# Patient Record
Sex: MALE | Race: WHITE | URBAN - METROPOLITAN AREA
[De-identification: names, ages, dates, MRNs, and addresses within clinical notes are randomized per-mention and may not be internally consistent; named-entity substitution may affect disease eponyms.]

---

## 2017-01-02 ENCOUNTER — HOSPITAL ENCOUNTER (EMERGENCY)
Facility: CLINIC | Age: 62
Discharge: HOME | End: 2017-01-03
Attending: EMERGENCY MEDICINE | Admitting: EMERGENCY MEDICINE
Payer: COMMERCIAL

## 2017-01-02 DIAGNOSIS — K80.50 BILIARY COLIC: ICD-10-CM

## 2017-01-02 PROCEDURE — 99285 EMERGENCY DEPT VISIT HI MDM: CPT | Performed by: EMERGENCY MEDICINE

## 2017-01-02 PROCEDURE — 93005 ELECTROCARDIOGRAM TRACING: CPT | Performed by: EMERGENCY MEDICINE

## 2017-01-02 PROCEDURE — 99285 EMERGENCY DEPT VISIT HI MDM: CPT | Mod: 25 | Performed by: EMERGENCY MEDICINE

## 2017-01-02 PROCEDURE — 93010 ELECTROCARDIOGRAM REPORT: CPT | Mod: Z6 | Performed by: EMERGENCY MEDICINE

## 2017-01-02 NOTE — ED AVS SNAPSHOT
[facility], [place], Emergency Department    [address]    [facility] [address]    Phone:  [phone]    Fax:  [phone]                                       Jaya Mari   MRN: [id number]    Department:  [facility], Emergency Department   Date of Visit:  1/2/2017           After Visit Summary Signature Page     I have received my discharge instructions, and my questions have been answered. I have discussed any challenges I see with this plan with the nurse or doctor.    ..........................................................................................................................................  Patient/Patient Representative Signature      ..........................................................................................................................................  Patient Representative Print Name and Relationship to Patient    ..................................................               ................................................  Date                                            Time    ..........................................................................................................................................  Reviewed by Signature/Title    ...................................................              ..............................................  Date                                                            Time

## 2017-01-02 NOTE — ED AVS SNAPSHOT
[facility], Emergency Department    [address]    [place] [address]    Phone:  [phone]    Fax:  [phone]                                       Jaya Mari   MRN: [id number]    Department:  [facility], Emergency Department   Date of Visit:  1/2/2017           Patient Information     Date Of Birth          1955        Your diagnoses for this visit were:     Biliary colic        You were seen by Jericho Walker MD.        Discharge Instructions       Please make an appointment to follow up with Surgery (General) (phone: [phone]) as soon as possible.  Return if worsening pain or fever.            24 Hour Appointment Hotline       To make an appointment at any [facility] clinic, call [phone] ([phone]). If you don't have a family doctor or clinic, we will help you find one. [facility] clinics are conveniently located to serve the needs of you and your family.             Review of your medicines      START taking        Dose / Directions Last dose taken    oxyCODONE 5 MG IR tablet   Commonly known as:  ROXICODONE   Dose:  5 mg   Quantity:  20 tablet        Take 1 tablet (5 mg) by mouth every 6 hours as needed for pain   Refills:  0          Our records show that you are taking the medicines listed below. If these are incorrect, please call your family doctor or clinic.        Dose / Directions Last dose taken    aspirin 325 MG tablet        Take by mouth daily   Refills:  0        atorvastatin 20 MG tablet   Commonly known as:  LIPITOR   Dose:  20 mg   Quantity:  90 tablet        Take 1 tablet (20 mg) by mouth daily   Refills:  1        metoprolol 25 MG tablet   Commonly known as:  LOPRESSOR   Quantity:  180 tablet        3 tabs twice daily (Cancel previous RX)   Refills:  5                Prescriptions were sent or printed at these locations (1 Prescription)                   Other Prescriptions                Printed at Department/Unit printer (1 of 1)          oxyCODONE (ROXICODONE) 5 MG IR tablet                Procedures and tests performed during your visit     CBC with platelets differential    Comprehensive metabolic panel    EKG 12 lead    Lipase    Troponin POCT      Orders Needing Specimen Collection     None      Pending Results     No orders found for last 2 day(s).            Thank you for choosing [facility]       Thank you for choosing [facility] for your care. Our goal is always to provide you with excellent care. Hearing back from our patients is one way we can continue to improve our services. Please take a few minutes to complete the written survey that you may receive in the mail after you visit with us. Thank you!        [employer] Information     [employer] gives you secure access to your electronic health record. If you see a primary care provider, you can also send messages to your care team and make appointments. If you have questions, please call your primary care clinic.  If you do not have a primary care provider, please call [phone] and they will assist you.        After Visit Summary       This is your record. Keep this with you and show to your community pharmacist(s) and doctor(s) at your next visit.

## 2017-01-03 VITALS
RESPIRATION RATE: 18 BRPM | SYSTOLIC BLOOD PRESSURE: 140 MMHG | OXYGEN SATURATION: 94 % | WEIGHT: 210 LBS | BODY MASS INDEX: 28.44 KG/M2 | DIASTOLIC BLOOD PRESSURE: 91 MMHG | HEIGHT: 72 IN | TEMPERATURE: 97.9 F

## 2017-01-03 LAB
ALBUMIN SERPL-MCNC: 4.1 G/DL (ref 3.4–5)
ALP SERPL-CCNC: 83 U/L (ref 40–150)
ALT SERPL W P-5'-P-CCNC: 16 U/L (ref 0–70)
ANION GAP SERPL CALCULATED.3IONS-SCNC: 8 MMOL/L (ref 3–14)
AST SERPL W P-5'-P-CCNC: 10 U/L (ref 0–45)
BASOPHILS # BLD AUTO: 0 10E9/L (ref 0–0.2)
BASOPHILS NFR BLD AUTO: 0.2 %
BILIRUB SERPL-MCNC: 0.4 MG/DL (ref 0.2–1.3)
BUN SERPL-MCNC: 15 MG/DL (ref 7–30)
CALCIUM SERPL-MCNC: 8.7 MG/DL (ref 8.5–10.1)
CHLORIDE SERPL-SCNC: 106 MMOL/L (ref 94–109)
CO2 SERPL-SCNC: 25 MMOL/L (ref 20–32)
CREAT SERPL-MCNC: 1.12 MG/DL (ref 0.66–1.25)
DIFFERENTIAL METHOD BLD: NORMAL
EOSINOPHIL # BLD AUTO: 0.1 10E9/L (ref 0–0.7)
EOSINOPHIL NFR BLD AUTO: 0.7 %
ERYTHROCYTE [DISTWIDTH] IN BLOOD BY AUTOMATED COUNT: 13.6 % (ref 10–15)
GFR SERPL CREATININE-BSD FRML MDRD: 67 ML/MIN/1.7M2
GLUCOSE SERPL-MCNC: 141 MG/DL (ref 70–99)
HCT VFR BLD AUTO: 47.8 % (ref 40–53)
HGB BLD-MCNC: 16.2 G/DL (ref 13.3–17.7)
IMM GRANULOCYTES # BLD: 0 10E9/L (ref 0–0.4)
IMM GRANULOCYTES NFR BLD: 0.3 %
INTERPRETATION ECG - MUSE: NORMAL
LIPASE SERPL-CCNC: 201 U/L (ref 73–393)
LYMPHOCYTES # BLD AUTO: 1.1 10E9/L (ref 0.8–5.3)
LYMPHOCYTES NFR BLD AUTO: 11.6 %
MCH RBC QN AUTO: 30.5 PG (ref 26.5–33)
MCHC RBC AUTO-ENTMCNC: 33.9 G/DL (ref 31.5–36.5)
MCV RBC AUTO: 90 FL (ref 78–100)
MONOCYTES # BLD AUTO: 0.8 10E9/L (ref 0–1.3)
MONOCYTES NFR BLD AUTO: 8.4 %
NEUTROPHILS # BLD AUTO: 7.4 10E9/L (ref 1.6–8.3)
NEUTROPHILS NFR BLD AUTO: 78.8 %
NRBC # BLD AUTO: 0 10*3/UL
NRBC BLD AUTO-RTO: 0 /100
PLATELET # BLD AUTO: 194 10E9/L (ref 150–450)
POTASSIUM SERPL-SCNC: 3.4 MMOL/L (ref 3.4–5.3)
PROT SERPL-MCNC: 7.7 G/DL (ref 6.8–8.8)
RBC # BLD AUTO: 5.32 10E12/L (ref 4.4–5.9)
SODIUM SERPL-SCNC: 139 MMOL/L (ref 133–144)
TROPONIN I BLD-MCNC: 0 UG/L (ref 0–0.1)
WBC # BLD AUTO: 9.3 10E9/L (ref 4–11)

## 2017-01-03 PROCEDURE — 84484 ASSAY OF TROPONIN QUANT: CPT

## 2017-01-03 PROCEDURE — 85025 COMPLETE CBC W/AUTO DIFF WBC: CPT | Performed by: EMERGENCY MEDICINE

## 2017-01-03 PROCEDURE — 83690 ASSAY OF LIPASE: CPT | Performed by: EMERGENCY MEDICINE

## 2017-01-03 PROCEDURE — 96374 THER/PROPH/DIAG INJ IV PUSH: CPT | Performed by: EMERGENCY MEDICINE

## 2017-01-03 PROCEDURE — 25000125 ZZHC RX 250: Performed by: EMERGENCY MEDICINE

## 2017-01-03 PROCEDURE — 80053 COMPREHEN METABOLIC PANEL: CPT | Performed by: EMERGENCY MEDICINE

## 2017-01-03 PROCEDURE — 96376 TX/PRO/DX INJ SAME DRUG ADON: CPT | Performed by: EMERGENCY MEDICINE

## 2017-01-03 PROCEDURE — 36415 COLL VENOUS BLD VENIPUNCTURE: CPT | Performed by: EMERGENCY MEDICINE

## 2017-01-03 RX ORDER — HYDROMORPHONE HYDROCHLORIDE 1 MG/ML
0.5 INJECTION, SOLUTION INTRAMUSCULAR; INTRAVENOUS; SUBCUTANEOUS ONCE
Status: DISCONTINUED | OUTPATIENT
Start: 2017-01-03 | End: 2017-01-03

## 2017-01-03 RX ORDER — HYDROMORPHONE HYDROCHLORIDE 1 MG/ML
0.5 INJECTION, SOLUTION INTRAMUSCULAR; INTRAVENOUS; SUBCUTANEOUS ONCE
Status: COMPLETED | OUTPATIENT
Start: 2017-01-03 | End: 2017-01-03

## 2017-01-03 RX ORDER — OXYCODONE HYDROCHLORIDE 5 MG/1
5 TABLET ORAL EVERY 6 HOURS PRN
Qty: 20 TABLET | Refills: 0 | Status: ON HOLD | OUTPATIENT
Start: 2017-01-03 | End: 2017-01-20

## 2017-01-03 RX ADMIN — HYDROMORPHONE HYDROCHLORIDE 0.5 MG: 10 INJECTION, SOLUTION INTRAMUSCULAR; INTRAVENOUS; SUBCUTANEOUS at 01:02

## 2017-01-03 RX ADMIN — HYDROMORPHONE HYDROCHLORIDE 1 MG: 1 INJECTION, SOLUTION INTRAMUSCULAR; INTRAVENOUS; SUBCUTANEOUS at 01:51

## 2017-01-03 NOTE — ED PROVIDER NOTES
"  History     Chief Complaint   Patient presents with     Back Pain     Pt c/o upper abd/back pain. Previous h/o gallbladder attacks.     HPI  Jaya Mari is a 61 year old male who presents to the emergency department today with complaints of upper abdominal and back pain. Patient reports a history of recurrent \"gallbladder attacks\" with right sided abdominal as well as diffuse mid-back pain over the past few years. He has been evaluated in the past and found to have cholelithiasis with decreased gallbladder ejection fraction. He has met with a surgeon who recommended cholecystectomy, however, patient states the procedure was not covered by his insurance so he did not follow up with this. Patient states he has had 3 similar episodes of pain over the past year. He states this current episode started around 6 PM tonight. He reports pain across his mid-back, worse on the right, that wraps around to his right upper quadrant. He rates the pain at about an 8/10 in severity. He also reports abdominal distention though denies nausea, vomiting, diarrhea or constipation. He states his bowel movements have been normal and denies any recent blood in his stool. No denies any urinary symptoms. He states he has been taking Advil and Aleve for the pain without any improvement. He denies any previous abdominal surgeries. He denies a known history of pancreatitis. He denies any alcohol use.     I have reviewed the Medications, Allergies, Past Medical and Surgical History, and Social History in the Epic system.    No past medical history on file.    Past Surgical History   Procedure Laterality Date     Head & neck surgery       [place] teeth removed.     Esophagoscopy, gastroscopy, duodenoscopy (egd), combined N/A 6/22/2015     Procedure: COMBINED ESOPHAGOSCOPY, GASTROSCOPY, DUODENOSCOPY (EGD), BIOPSY SINGLE OR MULTIPLE;  Surgeon: Dipika Rangel MD;  Location: [facility] OR       Family History   Problem Relation Age of Onset     " DONNA Father      Heart attack at 72      Cardiovascular Father        Social History   Substance Use Topics     Smoking status: Current Every Day Smoker -- 1.00 packs/day     Smokeless tobacco: Never Used      Comment: 1 pack a day, 20+ years     Alcohol Use: No     No current facility-administered medications for this encounter.     Current Outpatient Prescriptions   Medication     metoprolol (LOPRESSOR) 25 MG tablet     atorvastatin (LIPITOR) 20 MG tablet     aspirin 325 MG tablet        Allergies   Allergen Reactions     Keflex [Cephalexin]        Review of Systems   10 pt ROS completed and negative except as noted above in HPI    Physical Exam   BP: (!) 172/96 mmHg  Heart Rate: 125  Temp: 95.9  F (35.5  C)  Resp: 18  Height: 182.9 cm (6')  Weight: 95.255 kg (210 lb)  SpO2: 95 %  Physical Exam   Constitutional: He is oriented to person, place, and time. No distress.   HENT:   Head: Normocephalic and atraumatic.   Right Ear: External ear normal.   Left Ear: External ear normal.   Mouth/Throat: Oropharynx is clear and moist. No oropharyngeal exudate.   Eyes: Conjunctivae are normal. Pupils are equal, round, and reactive to light.   Neck: Normal range of motion. Neck supple.   Cardiovascular: Normal rate and intact distal pulses.    Pulmonary/Chest: Effort normal. No respiratory distress. He has no wheezes. He has no rales. He exhibits no tenderness.   Abdominal: Soft. He exhibits no distension. There is no tenderness. There is no rebound and no guarding.   Musculoskeletal: Normal range of motion. He exhibits no edema or tenderness.   Neurological: He is alert and oriented to person, place, and time. He exhibits normal muscle tone.   Skin: Skin is warm and dry. No rash noted. He is not diaphoretic.   Nursing note and vitals reviewed.      ED Course   Procedures   12:28 AM  The patient was seen and examined by Dr. Walker in Room ED04.              EKG Interpretation:      Interpreted by Jericho Walker  Time  reviewed: 000   Symptoms at time of EKG: abdominal pain   Rhythm: ST  Rate: 101  Axis: normal  Ectopy: none  Conduction: normal  ST Segments/ T Waves: No ST-T wave changes  Q Waves: none  Comparison to prior: No old EKG available    Clinical Impression: ST          Critical Care time:  none               Labs Ordered and Resulted from Time of ED Arrival Up to the Time of Departure from the ED   COMPREHENSIVE METABOLIC PANEL   LIPASE   CBC WITH PLATELETS DIFFERENTIAL   TROPONIN POCT       Assessments & Plan (with Medical Decision Making)   1.  Biliary colic    60 yo M who presents with right sided abdominal pain consistent with his previous biliary colic.  Labs normal. EKG with no ischemic changes and troponin negative.  Also, pain atypical for ACS.  Pain improved with IV dilaudid.  Exam benign, no evidence of SBI or acute abdomen.  Will discharge with oxycodone, surgery follow up for biliary colic.  Return if worsening symptoms.      I have reviewed the nursing notes.    I have reviewed the findings, diagnosis, plan and need for follow up with the patient.    New Prescriptions    No medications on file       Final diagnoses:   None   Joanie LARSEN, am serving as a trained medical scribe to document services personally performed by Jericho Walker MD, based on the provider's statements to me.      Jericho LARSEN MD, was physically present and have reviewed and verified the accuracy of this note documented by Joanie Franco.       1/2/2017   [facility], EMERGENCY DEPARTMENT      Jericho Walker MD  01/08/17 1025

## 2017-01-03 NOTE — DISCHARGE INSTRUCTIONS
Please make an appointment to follow up with Surgery (General) (phone: [phone]) as soon as possible.  Return if worsening pain or fever.

## 2017-01-05 ENCOUNTER — MYC MEDICAL ADVICE (OUTPATIENT)
Dept: FAMILY MEDICINE | Facility: CLINIC | Age: 62
End: 2017-01-05

## 2017-01-05 DIAGNOSIS — K80.20 CALCULUS OF GALLBLADDER WITHOUT CHOLECYSTITIS WITHOUT OBSTRUCTION: Primary | ICD-10-CM

## 2017-01-06 ENCOUNTER — TELEPHONE (OUTPATIENT)
Dept: FAMILY MEDICINE | Facility: CLINIC | Age: 62
End: 2017-01-06

## 2017-01-06 NOTE — TELEPHONE ENCOUNTER
1/6/2017    Call Regarding Preventive Health Screening Colonoscopy    Attempt 1    Message     Comments: Patient will call on own time          Outreach   le

## 2017-01-17 ENCOUNTER — MYC MEDICAL ADVICE (OUTPATIENT)
Dept: FAMILY MEDICINE | Facility: CLINIC | Age: 62
End: 2017-01-17

## 2017-01-18 ENCOUNTER — APPOINTMENT (OUTPATIENT)
Dept: ULTRASOUND IMAGING | Facility: CLINIC | Age: 62
End: 2017-01-18
Payer: COMMERCIAL

## 2017-01-18 ENCOUNTER — HOSPITAL ENCOUNTER (OUTPATIENT)
Facility: CLINIC | Age: 62
LOS: 1 days | Discharge: HOME | End: 2017-01-20
Attending: FAMILY MEDICINE | Admitting: SURGERY
Payer: COMMERCIAL

## 2017-01-18 ENCOUNTER — HOSPITAL ENCOUNTER (INPATIENT)
Facility: CLINIC | Age: 62
Setting detail: SURGERY ADMIT
End: 2017-01-18
Attending: SURGERY | Admitting: SURGERY
Payer: COMMERCIAL

## 2017-01-18 ENCOUNTER — APPOINTMENT (OUTPATIENT)
Dept: CT IMAGING | Facility: CLINIC | Age: 62
End: 2017-01-18
Payer: COMMERCIAL

## 2017-01-18 ENCOUNTER — APPOINTMENT (OUTPATIENT)
Dept: GENERAL RADIOLOGY | Facility: CLINIC | Age: 62
End: 2017-01-18
Payer: COMMERCIAL

## 2017-01-18 ENCOUNTER — ANESTHESIA EVENT (OUTPATIENT)
Dept: SURGERY | Facility: CLINIC | Age: 62
End: 2017-01-18
Payer: COMMERCIAL

## 2017-01-18 DIAGNOSIS — K81.0 ACUTE CHOLECYSTITIS: ICD-10-CM

## 2017-01-18 PROBLEM — K80.50 BILIARY COLIC: Status: ACTIVE | Noted: 2017-01-18

## 2017-01-18 LAB
ALBUMIN SERPL-MCNC: 4 G/DL (ref 3.4–5)
ALBUMIN UR-MCNC: NEGATIVE MG/DL
ALP SERPL-CCNC: 77 U/L (ref 40–150)
ALT SERPL W P-5'-P-CCNC: 15 U/L (ref 0–70)
ANION GAP SERPL CALCULATED.3IONS-SCNC: 10 MMOL/L (ref 3–14)
APPEARANCE UR: CLEAR
APTT PPP: 28 SEC (ref 22–37)
AST SERPL W P-5'-P-CCNC: 13 U/L (ref 0–45)
BASOPHILS # BLD AUTO: 0 10E9/L (ref 0–0.2)
BASOPHILS NFR BLD AUTO: 0.1 %
BILIRUB SERPL-MCNC: 0.6 MG/DL (ref 0.2–1.3)
BILIRUB UR QL STRIP: NEGATIVE
BUN SERPL-MCNC: 12 MG/DL (ref 7–30)
CALCIUM SERPL-MCNC: 9 MG/DL (ref 8.5–10.1)
CHLORIDE SERPL-SCNC: 107 MMOL/L (ref 94–109)
CO2 SERPL-SCNC: 23 MMOL/L (ref 20–32)
COLOR UR AUTO: ABNORMAL
CREAT SERPL-MCNC: 0.94 MG/DL (ref 0.66–1.25)
DIFFERENTIAL METHOD BLD: ABNORMAL
EOSINOPHIL # BLD AUTO: 0 10E9/L (ref 0–0.7)
EOSINOPHIL NFR BLD AUTO: 0.1 %
ERYTHROCYTE [DISTWIDTH] IN BLOOD BY AUTOMATED COUNT: 13.5 % (ref 10–15)
GFR SERPL CREATININE-BSD FRML MDRD: 82 ML/MIN/1.7M2
GLUCOSE SERPL-MCNC: 142 MG/DL (ref 70–99)
GLUCOSE UR STRIP-MCNC: NEGATIVE MG/DL
HCT VFR BLD AUTO: 48.8 % (ref 40–53)
HGB BLD-MCNC: 16.6 G/DL (ref 13.3–17.7)
HGB UR QL STRIP: NEGATIVE
IMM GRANULOCYTES # BLD: 0 10E9/L (ref 0–0.4)
IMM GRANULOCYTES NFR BLD: 0.2 %
INR PPP: 0.99 (ref 0.86–1.14)
KETONES UR STRIP-MCNC: NEGATIVE MG/DL
LACTATE BLD-SCNC: 0.9 MMOL/L (ref 0.7–2.1)
LACTATE BLD-SCNC: 2.4 MMOL/L (ref 0.7–2.1)
LEUKOCYTE ESTERASE UR QL STRIP: NEGATIVE
LIPASE SERPL-CCNC: 166 U/L (ref 73–393)
LYMPHOCYTES # BLD AUTO: 1.1 10E9/L (ref 0.8–5.3)
LYMPHOCYTES NFR BLD AUTO: 8.9 %
MCH RBC QN AUTO: 30.5 PG (ref 26.5–33)
MCHC RBC AUTO-ENTMCNC: 34 G/DL (ref 31.5–36.5)
MCV RBC AUTO: 90 FL (ref 78–100)
MONOCYTES # BLD AUTO: 0.7 10E9/L (ref 0–1.3)
MONOCYTES NFR BLD AUTO: 5.7 %
MUCOUS THREADS #/AREA URNS LPF: PRESENT /LPF
NEUTROPHILS # BLD AUTO: 10.4 10E9/L (ref 1.6–8.3)
NEUTROPHILS NFR BLD AUTO: 85 %
NITRATE UR QL: NEGATIVE
NRBC # BLD AUTO: 0 10*3/UL
NRBC BLD AUTO-RTO: 0 /100
PH UR STRIP: 6.5 PH (ref 5–7)
PLATELET # BLD AUTO: 224 10E9/L (ref 150–450)
POTASSIUM SERPL-SCNC: 3.5 MMOL/L (ref 3.4–5.3)
PROT SERPL-MCNC: 7.8 G/DL (ref 6.8–8.8)
RBC # BLD AUTO: 5.44 10E12/L (ref 4.4–5.9)
RBC #/AREA URNS AUTO: 1 /HPF (ref 0–2)
SODIUM SERPL-SCNC: 140 MMOL/L (ref 133–144)
SP GR UR STRIP: 1 (ref 1–1.03)
TROPONIN I BLD-MCNC: 0 UG/L (ref 0–0.1)
TROPONIN I SERPL-MCNC: NORMAL UG/L (ref 0–0.04)
URN SPEC COLLECT METH UR: ABNORMAL
UROBILINOGEN UR STRIP-MCNC: NORMAL MG/DL (ref 0–2)
WBC # BLD AUTO: 12.2 10E9/L (ref 4–11)
WBC #/AREA URNS AUTO: <1 /HPF (ref 0–2)

## 2017-01-18 PROCEDURE — 25000125 ZZHC RX 250: Performed by: STUDENT IN AN ORGANIZED HEALTH CARE EDUCATION/TRAINING PROGRAM

## 2017-01-18 PROCEDURE — G0378 HOSPITAL OBSERVATION PER HR: HCPCS

## 2017-01-18 PROCEDURE — 93010 ELECTROCARDIOGRAM REPORT: CPT | Mod: Z6 | Performed by: FAMILY MEDICINE

## 2017-01-18 PROCEDURE — 93005 ELECTROCARDIOGRAM TRACING: CPT | Performed by: FAMILY MEDICINE

## 2017-01-18 PROCEDURE — 81001 URINALYSIS AUTO W/SCOPE: CPT | Performed by: STUDENT IN AN ORGANIZED HEALTH CARE EDUCATION/TRAINING PROGRAM

## 2017-01-18 PROCEDURE — 96365 THER/PROPH/DIAG IV INF INIT: CPT | Performed by: FAMILY MEDICINE

## 2017-01-18 PROCEDURE — 96376 TX/PRO/DX INJ SAME DRUG ADON: CPT | Performed by: FAMILY MEDICINE

## 2017-01-18 PROCEDURE — 84484 ASSAY OF TROPONIN QUANT: CPT

## 2017-01-18 PROCEDURE — 85730 THROMBOPLASTIN TIME PARTIAL: CPT | Performed by: STUDENT IN AN ORGANIZED HEALTH CARE EDUCATION/TRAINING PROGRAM

## 2017-01-18 PROCEDURE — 84484 ASSAY OF TROPONIN QUANT: CPT | Performed by: STUDENT IN AN ORGANIZED HEALTH CARE EDUCATION/TRAINING PROGRAM

## 2017-01-18 PROCEDURE — 99285 EMERGENCY DEPT VISIT HI MDM: CPT | Mod: 25 | Performed by: FAMILY MEDICINE

## 2017-01-18 PROCEDURE — 85610 PROTHROMBIN TIME: CPT | Performed by: STUDENT IN AN ORGANIZED HEALTH CARE EDUCATION/TRAINING PROGRAM

## 2017-01-18 PROCEDURE — 80053 COMPREHEN METABOLIC PANEL: CPT | Performed by: STUDENT IN AN ORGANIZED HEALTH CARE EDUCATION/TRAINING PROGRAM

## 2017-01-18 PROCEDURE — 83690 ASSAY OF LIPASE: CPT | Performed by: STUDENT IN AN ORGANIZED HEALTH CARE EDUCATION/TRAINING PROGRAM

## 2017-01-18 PROCEDURE — 74177 CT ABD & PELVIS W/CONTRAST: CPT

## 2017-01-18 PROCEDURE — 83605 ASSAY OF LACTIC ACID: CPT | Performed by: STUDENT IN AN ORGANIZED HEALTH CARE EDUCATION/TRAINING PROGRAM

## 2017-01-18 PROCEDURE — 71020 XR CHEST 2 VW: CPT

## 2017-01-18 PROCEDURE — 25000125 ZZHC RX 250: Performed by: FAMILY MEDICINE

## 2017-01-18 PROCEDURE — 96361 HYDRATE IV INFUSION ADD-ON: CPT | Performed by: FAMILY MEDICINE

## 2017-01-18 PROCEDURE — 96375 TX/PRO/DX INJ NEW DRUG ADDON: CPT | Performed by: FAMILY MEDICINE

## 2017-01-18 PROCEDURE — 96367 TX/PROPH/DG ADDL SEQ IV INF: CPT

## 2017-01-18 PROCEDURE — 25500064 ZZH RX 255 OP 636: Performed by: FAMILY MEDICINE

## 2017-01-18 PROCEDURE — 25000128 H RX IP 250 OP 636: Performed by: STUDENT IN AN ORGANIZED HEALTH CARE EDUCATION/TRAINING PROGRAM

## 2017-01-18 PROCEDURE — 25000128 H RX IP 250 OP 636

## 2017-01-18 PROCEDURE — 85025 COMPLETE CBC W/AUTO DIFF WBC: CPT | Performed by: STUDENT IN AN ORGANIZED HEALTH CARE EDUCATION/TRAINING PROGRAM

## 2017-01-18 PROCEDURE — 25000132 ZZH RX MED GY IP 250 OP 250 PS 637: Performed by: STUDENT IN AN ORGANIZED HEALTH CARE EDUCATION/TRAINING PROGRAM

## 2017-01-18 PROCEDURE — 76705 ECHO EXAM OF ABDOMEN: CPT

## 2017-01-18 RX ORDER — ONDANSETRON 4 MG/1
4 TABLET, ORALLY DISINTEGRATING ORAL EVERY 6 HOURS PRN
Status: DISCONTINUED | OUTPATIENT
Start: 2017-01-18 | End: 2017-01-19

## 2017-01-18 RX ORDER — PROCHLORPERAZINE MALEATE 5 MG
5-10 TABLET ORAL EVERY 6 HOURS PRN
Status: DISCONTINUED | OUTPATIENT
Start: 2017-01-18 | End: 2017-01-19

## 2017-01-18 RX ORDER — HYDROMORPHONE HYDROCHLORIDE 1 MG/ML
0.5 INJECTION, SOLUTION INTRAMUSCULAR; INTRAVENOUS; SUBCUTANEOUS
Status: DISCONTINUED | OUTPATIENT
Start: 2017-01-18 | End: 2017-01-18

## 2017-01-18 RX ORDER — ACETAMINOPHEN 325 MG/1
650 TABLET ORAL EVERY 4 HOURS PRN
Status: DISCONTINUED | OUTPATIENT
Start: 2017-01-18 | End: 2017-01-19

## 2017-01-18 RX ORDER — MEROPENEM 1 G/1
1 INJECTION, POWDER, FOR SOLUTION INTRAVENOUS EVERY 8 HOURS
Status: DISCONTINUED | OUTPATIENT
Start: 2017-01-18 | End: 2017-01-19

## 2017-01-18 RX ORDER — PROCHLORPERAZINE 25 MG
25 SUPPOSITORY, RECTAL RECTAL EVERY 12 HOURS PRN
Status: DISCONTINUED | OUTPATIENT
Start: 2017-01-18 | End: 2017-01-19

## 2017-01-18 RX ORDER — SODIUM CHLORIDE 9 MG/ML
INJECTION, SOLUTION INTRAVENOUS
Status: COMPLETED
Start: 2017-01-18 | End: 2017-01-18

## 2017-01-18 RX ORDER — SODIUM CHLORIDE 9 MG/ML
1000 INJECTION, SOLUTION INTRAVENOUS CONTINUOUS
Status: DISCONTINUED | OUTPATIENT
Start: 2017-01-18 | End: 2017-01-18

## 2017-01-18 RX ORDER — IOPAMIDOL 755 MG/ML
100 INJECTION, SOLUTION INTRAVASCULAR ONCE
Status: COMPLETED | OUTPATIENT
Start: 2017-01-18 | End: 2017-01-18

## 2017-01-18 RX ORDER — MULTIPLE VITAMINS W/ MINERALS TAB 9MG-400MCG
1 TAB ORAL DAILY
COMMUNITY

## 2017-01-18 RX ORDER — AMOXICILLIN 250 MG
1-2 CAPSULE ORAL 2 TIMES DAILY
Status: DISCONTINUED | OUTPATIENT
Start: 2017-01-18 | End: 2017-01-19

## 2017-01-18 RX ORDER — ONDANSETRON 2 MG/ML
4 INJECTION INTRAMUSCULAR; INTRAVENOUS EVERY 6 HOURS PRN
Status: DISCONTINUED | OUTPATIENT
Start: 2017-01-18 | End: 2017-01-19

## 2017-01-18 RX ORDER — OXYCODONE HYDROCHLORIDE 5 MG/1
5-10 TABLET ORAL
Status: DISCONTINUED | OUTPATIENT
Start: 2017-01-18 | End: 2017-01-19

## 2017-01-18 RX ORDER — NALOXONE HYDROCHLORIDE 0.4 MG/ML
.1-.4 INJECTION, SOLUTION INTRAMUSCULAR; INTRAVENOUS; SUBCUTANEOUS
Status: DISCONTINUED | OUTPATIENT
Start: 2017-01-18 | End: 2017-01-19

## 2017-01-18 RX ORDER — MEROPENEM 1 G/1
1 INJECTION, POWDER, FOR SOLUTION INTRAVENOUS ONCE
Status: COMPLETED | OUTPATIENT
Start: 2017-01-18 | End: 2017-01-18

## 2017-01-18 RX ADMIN — IOPAMIDOL 100 ML: 755 INJECTION, SOLUTION INTRAVENOUS at 09:11

## 2017-01-18 RX ADMIN — SODIUM CHLORIDE 65 ML: 9 INJECTION, SOLUTION INTRAVENOUS at 09:12

## 2017-01-18 RX ADMIN — MEROPENEM 1 G: 1 INJECTION, POWDER, FOR SOLUTION INTRAVENOUS at 14:17

## 2017-01-18 RX ADMIN — DEXTROSE AND SODIUM CHLORIDE 1000 ML: 5; 900 INJECTION, SOLUTION INTRAVENOUS at 18:27

## 2017-01-18 RX ADMIN — Medication 1 TABLET: at 20:25

## 2017-01-18 RX ADMIN — HYDROMORPHONE HYDROCHLORIDE 0.5 MG: 10 INJECTION, SOLUTION INTRAMUSCULAR; INTRAVENOUS; SUBCUTANEOUS at 10:19

## 2017-01-18 RX ADMIN — HYDROMORPHONE HYDROCHLORIDE 0.5 MG: 10 INJECTION, SOLUTION INTRAMUSCULAR; INTRAVENOUS; SUBCUTANEOUS at 14:15

## 2017-01-18 RX ADMIN — Medication 1000 ML: at 08:48

## 2017-01-18 RX ADMIN — MEROPENEM 1 G: 1 INJECTION, POWDER, FOR SOLUTION INTRAVENOUS at 22:02

## 2017-01-18 RX ADMIN — METOPROLOL TARTRATE 75 MG: 50 TABLET ORAL at 20:24

## 2017-01-18 RX ADMIN — SODIUM CHLORIDE 1000 ML: 9 INJECTION, SOLUTION INTRAVENOUS at 08:48

## 2017-01-18 RX ADMIN — HYDROMORPHONE HYDROCHLORIDE 0.5 MG: 10 INJECTION, SOLUTION INTRAMUSCULAR; INTRAVENOUS; SUBCUTANEOUS at 08:49

## 2017-01-18 RX ADMIN — SODIUM CHLORIDE 1000 ML: 9 INJECTION, SOLUTION INTRAVENOUS at 09:54

## 2017-01-18 ASSESSMENT — ENCOUNTER SYMPTOMS
FEVER: 0
DYSRHYTHMIAS: 1
SHORTNESS OF BREATH: 0
ABDOMINAL PAIN: 0

## 2017-01-18 ASSESSMENT — LIFESTYLE VARIABLES: TOBACCO_USE: 1

## 2017-01-18 NOTE — IP AVS SNAPSHOT
Unit [address] Observation [address] [address]    Phone:  [phone]    Fax:  [phone]                                       After Visit Summary   1/18/2017    Jaya Mari    MRN: [id number]           After Visit Summary Signature Page     I have received my discharge instructions, and my questions have been answered. I have discussed any challenges I see with this plan with the nurse or doctor.    ..........................................................................................................................................  Patient/Patient Representative Signature      ..........................................................................................................................................  Patient Representative Print Name and Relationship to Patient    ..................................................               ................................................  Date                                            Time    ..........................................................................................................................................  Reviewed by Signature/Title    ...................................................              ..............................................  Date                                                            Time

## 2017-01-18 NOTE — PHARMACY-ADMISSION MEDICATION HISTORY
Admission Medication History status for the 1/18/2017 admission is complete.  See EPIC admission navigator for Prior to Admission medications.    Medication history sources:  Patient     Medication history source reliability: Good; patient knew his medications and doses well    Medication adherence:  Good; patient reports he takes his medications regularly, except he hasn't started the atorvastatin that was prescribed 1-2 months ago. He told his MD he didn't want to start until his gallbladder issues were resolved.    Changes made to PTA medication list (reason)  Added:   - Multivitamin 1 tablet po daily per patient. Patient reports he usually only takes 2-3 times per week.  Deleted: None  Changed:   - Metoprolol tartrate 75 mg po BID per patient (updated dose)    Additional medication history information (including reliability of information, actions taken by pharmacist):   - Patient reports he was given a prescription for oxycodone 2 weeks ago, but he didn't take until last night. He didn't get any relief from the medication.     Time spent in this activity: 15 minutes    Medication history completed by: Kimberlyn Pozo PharmD    Prior to Admission medications    Medication Sig Last Dose Taking? Auth Provider   METOPROLOL TARTRATE PO Take 75 mg by mouth 2 times daily 1/17/2017 Yes Unknown, Entered By History   multivitamin, therapeutic with minerals (MULTI-VITAMIN) TABS tablet Take 1 tablet by mouth daily Past Week Yes Unknown, Entered By History   oxyCODONE (ROXICODONE) 5 MG IR tablet Take 1 tablet (5 mg) by mouth every 6 hours as needed for pain 1/18/2017 at 0200 Yes Jericho Walker MD   aspirin 325 MG tablet Take by mouth daily 1/17/2017 at 1200 Yes Reported, Patient   atorvastatin (LIPITOR) 20 MG tablet Take 1 tablet (20 mg) by mouth daily Unknown at Unknown time  Karl Heath PA-C

## 2017-01-18 NOTE — ANESTHESIA PREPROCEDURE EVALUATION
Anesthesia Evaluation     . Pt has had prior anesthetic. Type: General    No history of anesthetic complications     ROS/MED HX    ENT/Pulmonary:     (+)tobacco use, , . .    Neurologic:  - neg neurologic ROS     Cardiovascular:     (+) Dyslipidemia, hypertension----. : . . . :. dysrhythmias a-fib, . Previous cardiac testing Echodate:8/3/2016results:Left ventricular function, chamber size, wall motion, and wall thickness are  normal.The EF is 60-65%.  Both atria appear normal.date: results:ECG reviewed date:1/2/2017 results:Sinus Tachycardia date: results:          METS/Exercise Tolerance:     Hematologic:  - neg hematologic  ROS       Musculoskeletal:  - neg musculoskeletal ROS       GI/Hepatic:     (+) cholecystitis/cholelithiasis,       Renal/Genitourinary:  - ROS Renal section negative       Endo:     (+) type II DM .      Psychiatric:     (+) psychiatric history anxiety      Infectious Disease:  - neg infectious disease ROS       Malignancy:      - no malignancy   Other:               Physical Exam  Normal systems: cardiovascular, pulmonary and dental    Airway   Mallampati: II  TM distance: >3 FB  Neck ROM: full    Dental     Cardiovascular   Rhythm and rate: regular and normal      Pulmonary    breath sounds clear to auscultation    Other findings: /69 mmHg  Pulse 109  Temp(Src) 36.7  C (98.1  F) (Oral)  Resp 16  Wt 95.936 kg (211 lb 8 oz)  SpO2 98%                  Anesthesia Plan      History & Physical Review  History and physical reviewed and following examination; no interval change.    ASA Status:  2 .    NPO Status:  > 8 hours    Plan for General, ETT and RSI with Intravenous and Propofol induction. Maintenance will be Inhalation and Balanced.    PONV prophylaxis:  Ondansetron (or other 5HT-3) and Dexamethasone or Solumedrol  Additional equipment: 2nd IV and Arterial Line I agree with the assessment/plan by the anesthesia resident.  I have personally evaluated the patient and gone over  risks/benefits of anesthesia with them.      Arthur Smith MD  Staff Anesthesiologist  Phone: [phone]  January 19, 2017, 11:14 AM        Postoperative Care  Postoperative pain management:  IV analgesics, Oral pain medications and Multi-modal analgesia.      Consents  Anesthetic plan, risks, benefits and alternatives discussed with:  Patient..        ANESTHESIA PREOP EVALUATION    Procedure: Procedure(s):  Davinci Assisted Laparoscopic Cholecystectomy without Grams  - Wound Class: II-Clean Contaminated    HPI: Jaya Mari is a 61 year old male with a.fib controlled on metoprolol, and cholelithiasis, presenting for above procedure.    PMHx/PSHx/ROS:  Past Medical History   Diagnosis Date     Paroxysmal atrial fibrillation (H)        Past Surgical History   Procedure Laterality Date     Head & neck surgery       [place] teeth removed.     Esophagoscopy, gastroscopy, duodenoscopy (egd), combined N/A 6/22/2015     Procedure: COMBINED ESOPHAGOSCOPY, GASTROSCOPY, DUODENOSCOPY (EGD), BIOPSY SINGLE OR MULTIPLE;  Surgeon: Dipika Rangel MD;  Location: [facility] OR         Past Anes Hx: No personal or family h/o anesthesia problems    Soc Hx:   Social History   Substance Use Topics     Smoking status: Current Every Day Smoker -- 1.00 packs/day     Smokeless tobacco: Never Used      Comment: 1 pack a day, 20+ years     Alcohol Use: No       Allergies:   Allergies   Allergen Reactions     Keflex [Cephalexin]        Meds:   Prescriptions prior to admission   Medication Sig Dispense Refill Last Dose     METOPROLOL TARTRATE PO Take 75 mg by mouth 2 times daily   1/17/2017     multivitamin, therapeutic with minerals (MULTI-VITAMIN) TABS tablet Take 1 tablet by mouth daily   Past Week     oxyCODONE (ROXICODONE) 5 MG IR tablet Take 1 tablet (5 mg) by mouth every 6 hours as needed for pain 20 tablet 0 1/18/2017 at 0200     aspirin 325 MG tablet Take by mouth daily   1/17/2017 at 1200     atorvastatin (LIPITOR) 20 MG tablet Take 1  tablet (20 mg) by mouth daily 90 tablet 1 Unknown at Unknown time       No current outpatient prescriptions on file.       Physical Exam:  Vitals: /69 mmHg  Pulse 109  Temp(Src) 36.7  C (98.1  F) (Oral)  Resp 16  Wt 95.936 kg (211 lb 8 oz)  SpO2 98%  BMI= Body mass index is 28.68 kg/(m^2).      Labs:  UPT: No results found for: HCGQUANT      BMP:  Recent Labs   Lab Test  01/18/17   0841   NA  140   POTASSIUM  3.5   CHLORIDE  107   CO2  23   BUN  12   CR  0.94   GLC  142*   SONIYA  9.0     CBC:   Recent Labs   Lab Test  01/18/17   0841   WBC  12.2*   RBC  5.44   HGB  16.6   HCT  48.8   MCV  90   MCH  30.5   MCHC  34.0   RDW  13.5   PLT  224     Coags:  Recent Labs   Lab Test  01/18/17   0841   INR  0.99   PTT  28       Assessment/Plan:  - ASA 2  - GETA with standard ASA monitors, IV induction, balanced anesthetic  - RSI  - Pre-induction:   - Versed 1-2 mg   - PIVx2   - Consider arterial line, No central line   - Antibiotics per surgeon  - Induction:   - Mac 4   - ETT 7.5   - Propofol, Rocuronium, Fentanyl, Lidocaine  - Maintenance:   - Isoflurane   - Analgesia: Fentanyl   - Fluids: LR 1 mL/kg/hr maint, < 1L total volume  - Emergence:   - Reversal: Sugammadex   - PONV prophylaxis: Susana Urbina Jr., MD  Anesthesia Resident - CA1    1/18/2017  3:41 PM

## 2017-01-18 NOTE — IP AVS SNAPSHOT
MRN:[id number]                      After Visit Summary   1/18/2017    Jaya Mari    MRN: [id number]           Thank you!     Thank you for choosing [facility] for your care. Our goal is always to provide you with excellent care. Hearing back from our patients is one way we can continue to improve our services. Please take a few minutes to complete the written survey that you may receive in the mail after you visit with us. Thank you!        Patient Information     Date Of Birth          1955        About your hospital stay     You were admitted on:  January 18, 2017 You last received care in the:  [facility] Observation [facility]    You were discharged on:  January 20, 2017        Reason for your hospital stay       Acute cholecystitis                  Who to Call     For medical emergencies, please call 911.  For non-urgent questions about your medical care, please call your primary care provider or clinic, [phone]  For questions related to your surgery, please call your surgery clinic        Attending Provider     Provider    Melecio Arevlao MD Loor, Michele Montejo, MD       Primary Care Provider Office Phone # Fax #    Karl Heath PA-C [phone]       [address] [address]        After Care Instructions     Activity       No lifting >10 pounds for 4 weeks.  No rigorous physical activity.    May shower now. No bathing for two weeks.    Call [phone] to schedule or with routine questions during the work week.      Call [phone] and ask to speak with surgery resident if you are having troubles in the evenings, at night, or on weekends. Please call if you experience increasing abdominal pain, nausea, vomiting, increasing drainage from your wounds, chills, or fever >101.5    Take stool softener while taking narcotic pain medication.    No driving for at least 12 hours after taking narcotic pain  medication.            Diet Instructions       Resume pre-procedure diet            Diet       Follow this diet upon discharge: Orders Placed This Encounter  Advance Diet as Tolerated: Regular Diet Adult            Discharge Instructions       Patient to follow up with Dr. Washington in General Surgery in approximately 2 weeks.    If you have not been contacted regarding your appointment in one week, please call the surgery clinic to schedule your appointment            No Alcohol       For 24 hours post procedure            No driving or operating machinery        until the day after procedure            No lifting        No lifting over 15 lbs and no strenuous physical activity for 3 weeks            Shower       No shower for 24 hours post procedure. May shower Postoperative Day (POD)  1.  Keep your incisions clean and dry.  Your incisions are covered with waterproof surgical glue that will wash off on its own in approximately 7-10 days.  You may shower and wash incisions normally - do not aggressively scrub.  No swimming or baths for 3 weeks.  Your stitches are all dissolvable and underneath the skin -- no need to have removed.     Call or return to hospital if you experience fevers >101.5, persistent nausea or vomiting, intractable pain not controlled with oral pain medications, increasing pain, swelling, redness, or drainage from your incisions, or inability to eat or drink, inability to stay hydrated, or symptoms of dehydration including inability to void or lightheadedness.                  Follow-up Appointments     Follow Up and recommended labs and tests       Follow-up as needed in clinic in 2-4 weeks with Dr. Felix MD. If your surgeon is not available in this time-frame you might see one of their partners. You should be contacted by a nurse within 3 business days to check how you are doing. If you are not contacted please call RN care coordinator: Aleshia Martin [phone].                  Pending Results      Date and Time Order Name Status Description    1/19/2017 1445 Surgical pathology exam In process             Statement of Approval     Ordered          01/20/17 1002  I have reviewed and agree with all the recommendations and orders detailed in this document.   EFFECTIVE NOW     Approved and electronically signed by:  Charles Julien MD             Admission Information        Provider Department Dept Phone    1/18/2017 Abner Washington MD [facility] [phone]      Your Vitals Were     Blood Pressure Pulse Temperature Respirations Weight Pulse Oximetry    112/70 mmHg 109 98.2  F (36.8  C) (Oral) 16 95.936 kg (211 lb 8 oz) 94%      MyChart Information     [employer] gives you secure access to your electronic health record. If you see a primary care provider, you can also send messages to your care team and make appointments. If you have questions, please call your primary care clinic.  If you do not have a primary care provider, please call [phone] and they will assist you.        Care EveryWhere ID     This is your Care EveryWhere ID. This could be used by other organizations to access your [place] medical records  [id number]           Review of your medicines      START taking        Dose / Directions    acetaminophen 325 MG tablet   Commonly known as:  TYLENOL   Used for:  Acute cholecystitis        Dose:  650 mg   Take 2 tablets (650 mg) by mouth every 6 hours   Quantity:  100 tablet   Refills:  0         CONTINUE these medicines which may have CHANGED, or have new prescriptions. If we are uncertain of the size of tablets/capsules you have at home, strength may be listed as something that might have changed.        Dose / Directions    oxyCODONE 5 MG IR tablet   Commonly known as:  ROXICODONE   This may have changed:    - how much to take  - when to take this  - reasons to take this   Used for:  Acute cholecystitis        Dose:  5-10 mg   Take 1-2 tablets (5-10 mg) by mouth every 3  hours as needed for moderate to severe pain   Quantity:  15 tablet   Refills:  0         CONTINUE these medicines which have NOT CHANGED        Dose / Directions    aspirin 325 MG tablet        Take by mouth daily   Refills:  0       atorvastatin 20 MG tablet   Commonly known as:  LIPITOR   Used for:  Hyperlipidemia LDL goal <130        Dose:  20 mg   Take 1 tablet (20 mg) by mouth daily   Quantity:  90 tablet   Refills:  1       METOPROLOL TARTRATE PO        Dose:  75 mg   Take 75 mg by mouth 2 times daily   Refills:  0       Multi-vitamin Tabs tablet        Dose:  1 tablet   Take 1 tablet by mouth daily   Refills:  0            Where to get your medicines      Some of these will need a paper prescription and others can be bought over the counter. Ask your nurse if you have questions.     Bring a paper prescription for each of these medications    - oxyCODONE 5 MG IR tablet    You don't need a prescription for these medications    - acetaminophen 325 MG tablet             Protect others around you: Learn how to safely use, store and throw away your medicines at www.disposemymeds.org.             Medication List: This is a list of all your medications and when to take them. Check marks below indicate your daily home schedule. Keep this list as a reference.      Medications           Morning Afternoon Evening Bedtime As Needed    acetaminophen 325 MG tablet   Commonly known as:  TYLENOL   Take 2 tablets (650 mg) by mouth every 6 hours   Last time this was given:  650 mg on 1/20/2017  5:12 AM                                aspirin 325 MG tablet   Take by mouth daily                                atorvastatin 20 MG tablet   Commonly known as:  LIPITOR   Take 1 tablet (20 mg) by mouth daily                                METOPROLOL TARTRATE PO   Take 75 mg by mouth 2 times daily   Last time this was given:  1/20/2017  8:11 AM                                Multi-vitamin Tabs tablet   Take 1 tablet by mouth daily                                 oxyCODONE 5 MG IR tablet   Commonly known as:  ROXICODONE   Take 1-2 tablets (5-10 mg) by mouth every 3 hours as needed for moderate to severe pain

## 2017-01-18 NOTE — H&P
General Surgery History and Physical    Name: Jaya Mari MRN # [id number]   Age: 61 year old YOB: 1955     Date of Admission: January 18, 2017  Admitting service: S  Admitting physician: Abner Washington MD            Chief Complaint:   Abdominal pain         History of Present Illness:   Jaya Mari is a 61 year old male with significant history of paroxysmal AFib on aspirin 325, who presents with acute onset abdominal pain. The pain started around 9pm after eating a grilled cheese sandwich for dinner at 8pm last night. It started in his back and wrapped around the right side to the front. He denies any associated fevers, chills, nausea, vomiting, changes in bowel habits. He has had that kind of pain before, but has been treated symptomatically, however the pain lasted for 4-5 hours max. This time it was >10 hours, thus he went to the ED at [place]. The pain resolved with IV dilaudid.          Review of Systems:   The Review of Systems is negative other than noted in the HPI         Past Medical History:     Past Medical History   Diagnosis Date     Paroxysmal atrial fibrillation (H)              Past Surgical History:     Past Surgical History   Procedure Laterality Date     Head & neck surgery       [place] teeth removed.     Esophagoscopy, gastroscopy, duodenoscopy (egd), combined N/A 6/22/2015     Procedure: COMBINED ESOPHAGOSCOPY, GASTROSCOPY, DUODENOSCOPY (EGD), BIOPSY SINGLE OR MULTIPLE;  Surgeon: Dipkia Rangel MD;  Location:  OR             Social History:     Social History     Social History     Marital Status: Single     Spouse Name: N/A     Number of Children: N/A     Years of Education: N/A     Occupational History     Not on file.     Social History Main Topics     Smoking status: Current Every Day Smoker -- 1.00 packs/day     Smokeless tobacco: Never Used      Comment: 1 pack a day, 20+ years     Alcohol Use: No     Drug Use: No     Sexual Activity: Not on file     Other  Topics Concern     Parent/Sibling W/ Cabg, Mi Or Angioplasty Before 65f 55m? No     Social History Narrative            Family History:     Family History   Problem Relation Age of Onset     C.A.D. Father      Heart attack at 72      Cardiovascular Father             Allergies:      Allergies   Allergen Reactions     Keflex [Cephalexin]             Medications:     No current facility-administered medications on file prior to encounter.  Current Outpatient Prescriptions on File Prior to Encounter:  oxyCODONE (ROXICODONE) 5 MG IR tablet Take 1 tablet (5 mg) by mouth every 6 hours as needed for pain   aspirin 325 MG tablet Take by mouth daily   atorvastatin (LIPITOR) 20 MG tablet Take 1 tablet (20 mg) by mouth daily            Vital Signs:   Blood pressure 132/82, pulse 109, temperature 98.2  F (36.8  C), temperature source Oral, resp. rate 16, weight 97.07 kg (214 lb), SpO2 96 %.    Vitals were reviewed.         Physical Exam:   General: Alert & oriented (x3), following commands  Chest: Non labored breathing, clear on auscultation  CVS: RRR  Abdomen: soft, obese, non tender, non distended  Extremities: Warm and well perfused, non tender, peripheral pulses palpable         Data:   CBC  Recent Labs  Lab 01/18/17  0841   WBC 12.2*   HGB 16.6        BMP  Recent Labs  Lab 01/18/17  0841      POTASSIUM 3.5   CHLORIDE 107   CO2 23   BUN 12   CR 0.94   *     Coags  Recent Labs  Lab 01/18/17  0841   INR 0.99   PTT 28     Imaging:   ULTRASOUND ABDOMEN LIMITED  1/18/2017 11:34 AM    HISTORY: Right upper quadrant pain.  COMPARISON: CT of the abdomen and pelvis performed 1/18/2017.     FINDINGS: The liver is unremarkable. No evidence for fatty infiltration. No focal hepatic lesions. There is a gallstone lodged in the gallbladder neck. An echogenic lesion along the gallbladder wall likely represents gallbladder polyp and measures 1.4 cm. The gallbladder wall is borderline thickened at 0.3 cm. No pericholecystic  fluid is identified. No intra- or extrahepatic bile duct dilatation. Common hepatic duct is normal in diameter. Limited evaluation of the right kidney is unremarkable. Pancreas is partially obscured by  overlying bowel gas, but appears normal where seen.                                                                     IMPRESSION:    1. There is a gallstone lodged in the gallbladder neck, and the gallbladder wall is borderline thickened. Acute cholecystitis could have this appearance. Please clinically correlate.  2. Probable gallbladder polyp measures 1.4 cm. Surgical consultation is recommended.    CT ABDOMEN AND PELVIS WITH CONTRAST January 18, 2017 9:18 AM    HISTORY: Right-sided flank and epigastric pain.  COMPARISON: None.    FINDINGS:    Abdomen: 1 cm probable cyst in the medial segment of the left lobe of the liver. The spleen is unremarkable. A few small pancreatic parenchymal calcifications. The adrenal glands and kidneys are unremarkable. The gallbladder is present. No enlarged lymph nodes or free fluid in the upper abdomen. Atherosclerotic calcification in the abdominal aorta.     Scan through the lower chest is unremarkable.     Pelvis: The small and large bowel are normal in caliber. The appearance of the appendix is within normal limits. No bowel wall thickening, pneumatosis or free intraperitoneal gas. No enlarged lymph nodes or free fluid in the pelvis. Tiny paraumbilical hernia containing fat.                                                                   IMPRESSION:    1. No cause of acute pain identified in the abdomen or pelvis.  2. A few tiny pancreatic parenchymal calcifications, suggestive of chronic pancreatitis.            Assessment and Plan:   Assessment:   61M w/ paroxysmal AFib on 325 aspirin and intermittent abdominal pain, admitted for observation from [place] ED with biliary colic and imaging consistent with early cholecystitis      Plan:   - Admit to [facility]  - NPO/mIVF  - C/w  meropenem  - Pain control/antiemetics  - Consent/Preop orders for robotic assisted laparoscopic lap mulu  - Home metoprolol  - Hold aspirin  - EKG, CXR     Assessment and plan discussed with the team.     Valdez Conde MD  Surgery, PGY1

## 2017-01-19 ENCOUNTER — ANESTHESIA (OUTPATIENT)
Dept: SURGERY | Facility: CLINIC | Age: 62
End: 2017-01-19
Payer: COMMERCIAL

## 2017-01-19 PROBLEM — K81.2 ACUTE ON CHRONIC CHOLECYSTITIS: Status: ACTIVE | Noted: 2017-01-19

## 2017-01-19 LAB
ABO + RH BLD: NORMAL
ABO + RH BLD: NORMAL
ALBUMIN SERPL-MCNC: 3.5 G/DL (ref 3.4–5)
ALP SERPL-CCNC: 71 U/L (ref 40–150)
ALT SERPL W P-5'-P-CCNC: 16 U/L (ref 0–70)
ANION GAP SERPL CALCULATED.3IONS-SCNC: 8 MMOL/L (ref 3–14)
AST SERPL W P-5'-P-CCNC: 13 U/L (ref 0–45)
BILIRUB SERPL-MCNC: 1.6 MG/DL (ref 0.2–1.3)
BLD GP AB SCN SERPL QL: NORMAL
BLOOD BANK CMNT PATIENT-IMP: NORMAL
BUN SERPL-MCNC: 6 MG/DL (ref 7–30)
CALCIUM SERPL-MCNC: 8.4 MG/DL (ref 8.5–10.1)
CHLORIDE SERPL-SCNC: 108 MMOL/L (ref 94–109)
CO2 SERPL-SCNC: 24 MMOL/L (ref 20–32)
CREAT SERPL-MCNC: 1 MG/DL (ref 0.66–1.25)
ERYTHROCYTE [DISTWIDTH] IN BLOOD BY AUTOMATED COUNT: 14.1 % (ref 10–15)
GFR SERPL CREATININE-BSD FRML MDRD: 76 ML/MIN/1.7M2
GLUCOSE SERPL-MCNC: 103 MG/DL (ref 70–99)
HCT VFR BLD AUTO: 45.1 % (ref 40–53)
HGB BLD-MCNC: 14.8 G/DL (ref 13.3–17.7)
INTERPRETATION ECG - MUSE: NORMAL
MCH RBC QN AUTO: 29.9 PG (ref 26.5–33)
MCHC RBC AUTO-ENTMCNC: 32.8 G/DL (ref 31.5–36.5)
MCV RBC AUTO: 91 FL (ref 78–100)
PLATELET # BLD AUTO: 185 10E9/L (ref 150–450)
POTASSIUM SERPL-SCNC: 3.6 MMOL/L (ref 3.4–5.3)
PROT SERPL-MCNC: 6.8 G/DL (ref 6.8–8.8)
RBC # BLD AUTO: 4.95 10E12/L (ref 4.4–5.9)
SODIUM SERPL-SCNC: 140 MMOL/L (ref 133–144)
SPECIMEN EXP DATE BLD: NORMAL
WBC # BLD AUTO: 7.5 10E9/L (ref 4–11)

## 2017-01-19 PROCEDURE — C9399 UNCLASSIFIED DRUGS OR BIOLOG: HCPCS | Performed by: STUDENT IN AN ORGANIZED HEALTH CARE EDUCATION/TRAINING PROGRAM

## 2017-01-19 PROCEDURE — 36000088 ZZH SURGERY LEVEL 8 EA 15 ADDTL MIN - UMMC: Performed by: SURGERY

## 2017-01-19 PROCEDURE — 96367 TX/PROPH/DG ADDL SEQ IV INF: CPT

## 2017-01-19 PROCEDURE — 86901 BLOOD TYPING SEROLOGIC RH(D): CPT | Performed by: ANESTHESIOLOGY

## 2017-01-19 PROCEDURE — 25000125 ZZHC RX 250: Performed by: STUDENT IN AN ORGANIZED HEALTH CARE EDUCATION/TRAINING PROGRAM

## 2017-01-19 PROCEDURE — 25000128 H RX IP 250 OP 636: Performed by: STUDENT IN AN ORGANIZED HEALTH CARE EDUCATION/TRAINING PROGRAM

## 2017-01-19 PROCEDURE — 25000132 ZZH RX MED GY IP 250 OP 250 PS 637: Performed by: STUDENT IN AN ORGANIZED HEALTH CARE EDUCATION/TRAINING PROGRAM

## 2017-01-19 PROCEDURE — 88304 TISSUE EXAM BY PATHOLOGIST: CPT | Performed by: SURGERY

## 2017-01-19 PROCEDURE — 25000125 ZZHC RX 250: Performed by: ANESTHESIOLOGY

## 2017-01-19 PROCEDURE — G0378 HOSPITAL OBSERVATION PER HR: HCPCS

## 2017-01-19 PROCEDURE — 25000128 H RX IP 250 OP 636: Performed by: ANESTHESIOLOGY

## 2017-01-19 PROCEDURE — 86850 RBC ANTIBODY SCREEN: CPT | Performed by: ANESTHESIOLOGY

## 2017-01-19 PROCEDURE — 36000086 ZZH SURGERY LEVEL 8 1ST 30 MIN UMMC: Performed by: SURGERY

## 2017-01-19 PROCEDURE — 40000141 ZZH STATISTIC PERIPHERAL IV START W/O US GUIDANCE

## 2017-01-19 PROCEDURE — 36415 COLL VENOUS BLD VENIPUNCTURE: CPT | Performed by: STUDENT IN AN ORGANIZED HEALTH CARE EDUCATION/TRAINING PROGRAM

## 2017-01-19 PROCEDURE — 25800025 ZZH RX 258: Performed by: STUDENT IN AN ORGANIZED HEALTH CARE EDUCATION/TRAINING PROGRAM

## 2017-01-19 PROCEDURE — 25800025 ZZH RX 258: Performed by: ANESTHESIOLOGY

## 2017-01-19 PROCEDURE — 86900 BLOOD TYPING SEROLOGIC ABO: CPT | Performed by: ANESTHESIOLOGY

## 2017-01-19 PROCEDURE — 40000275 ZZH STATISTIC RCP TIME EA 10 MIN

## 2017-01-19 PROCEDURE — 37000008 ZZH ANESTHESIA TECHNICAL FEE, 1ST 30 MIN: Performed by: SURGERY

## 2017-01-19 PROCEDURE — 40000014 ZZH STATISTIC ARTERIAL MONITORING DAILY

## 2017-01-19 PROCEDURE — 80053 COMPREHEN METABOLIC PANEL: CPT | Performed by: STUDENT IN AN ORGANIZED HEALTH CARE EDUCATION/TRAINING PROGRAM

## 2017-01-19 PROCEDURE — 40000802 ZZH SITE CHECK

## 2017-01-19 PROCEDURE — 71000014 ZZH RECOVERY PHASE 1 LEVEL 2 FIRST HR: Performed by: SURGERY

## 2017-01-19 PROCEDURE — C9290 INJ, BUPIVACAINE LIPOSOME: HCPCS | Performed by: ANESTHESIOLOGY

## 2017-01-19 PROCEDURE — 40000171 ZZH STATISTIC PRE-PROCEDURE ASSESSMENT III: Performed by: SURGERY

## 2017-01-19 PROCEDURE — 37000009 ZZH ANESTHESIA TECHNICAL FEE, EACH ADDTL 15 MIN: Performed by: SURGERY

## 2017-01-19 PROCEDURE — 85027 COMPLETE CBC AUTOMATED: CPT | Performed by: STUDENT IN AN ORGANIZED HEALTH CARE EDUCATION/TRAINING PROGRAM

## 2017-01-19 PROCEDURE — 27210794 ZZH OR GENERAL SUPPLY STERILE: Performed by: SURGERY

## 2017-01-19 PROCEDURE — 25000565 ZZH ISOFLURANE, EA 15 MIN: Performed by: SURGERY

## 2017-01-19 RX ORDER — SODIUM CHLORIDE, SODIUM LACTATE, POTASSIUM CHLORIDE, CALCIUM CHLORIDE 600; 310; 30; 20 MG/100ML; MG/100ML; MG/100ML; MG/100ML
INJECTION, SOLUTION INTRAVENOUS CONTINUOUS
Status: DISCONTINUED | OUTPATIENT
Start: 2017-01-19 | End: 2017-01-20 | Stop reason: HOSPADM

## 2017-01-19 RX ORDER — DIMENHYDRINATE 50 MG/ML
25 INJECTION, SOLUTION INTRAMUSCULAR; INTRAVENOUS
Status: DISCONTINUED | OUTPATIENT
Start: 2017-01-19 | End: 2017-01-19 | Stop reason: HOSPADM

## 2017-01-19 RX ORDER — OXYCODONE HYDROCHLORIDE 5 MG/1
5-10 TABLET ORAL
Status: DISCONTINUED | OUTPATIENT
Start: 2017-01-19 | End: 2017-01-20 | Stop reason: HOSPADM

## 2017-01-19 RX ORDER — ALBUTEROL SULFATE 0.83 MG/ML
2.5 SOLUTION RESPIRATORY (INHALATION) EVERY 4 HOURS PRN
Status: DISCONTINUED | OUTPATIENT
Start: 2017-01-19 | End: 2017-01-19

## 2017-01-19 RX ORDER — BUPIVACAINE HYDROCHLORIDE AND EPINEPHRINE 2.5; 5 MG/ML; UG/ML
INJECTION, SOLUTION INFILTRATION; PERINEURAL PRN
Status: DISCONTINUED | OUTPATIENT
Start: 2017-01-19 | End: 2017-01-19

## 2017-01-19 RX ORDER — FLUMAZENIL 0.1 MG/ML
0.2 INJECTION, SOLUTION INTRAVENOUS
Status: DISCONTINUED | OUTPATIENT
Start: 2017-01-19 | End: 2017-01-19 | Stop reason: HOSPADM

## 2017-01-19 RX ORDER — MEPERIDINE HYDROCHLORIDE 25 MG/ML
12.5 INJECTION INTRAMUSCULAR; INTRAVENOUS; SUBCUTANEOUS EVERY 5 MIN PRN
Status: DISCONTINUED | OUTPATIENT
Start: 2017-01-19 | End: 2017-01-19 | Stop reason: HOSPADM

## 2017-01-19 RX ORDER — CEFAZOLIN SODIUM 1 G/3ML
1 INJECTION, POWDER, FOR SOLUTION INTRAMUSCULAR; INTRAVENOUS SEE ADMIN INSTRUCTIONS
Status: DISCONTINUED | OUTPATIENT
Start: 2017-01-19 | End: 2017-01-19 | Stop reason: HOSPADM

## 2017-01-19 RX ORDER — PHYSOSTIGMINE SALICYLATE 1 MG/ML
1.2 INJECTION INTRAVENOUS
Status: DISCONTINUED | OUTPATIENT
Start: 2017-01-19 | End: 2017-01-19 | Stop reason: HOSPADM

## 2017-01-19 RX ORDER — KETOROLAC TROMETHAMINE 30 MG/ML
30 INJECTION, SOLUTION INTRAMUSCULAR; INTRAVENOUS
Status: DISCONTINUED | OUTPATIENT
Start: 2017-01-19 | End: 2017-01-19 | Stop reason: HOSPADM

## 2017-01-19 RX ORDER — HYDROMORPHONE HYDROCHLORIDE 1 MG/ML
.3-.5 INJECTION, SOLUTION INTRAMUSCULAR; INTRAVENOUS; SUBCUTANEOUS EVERY 10 MIN PRN
Status: DISCONTINUED | OUTPATIENT
Start: 2017-01-19 | End: 2017-01-19 | Stop reason: HOSPADM

## 2017-01-19 RX ORDER — GLYCOPYRROLATE 0.2 MG/ML
INJECTION, SOLUTION INTRAMUSCULAR; INTRAVENOUS PRN
Status: DISCONTINUED | OUTPATIENT
Start: 2017-01-19 | End: 2017-01-19

## 2017-01-19 RX ORDER — HYDROMORPHONE HYDROCHLORIDE 1 MG/ML
.3-.5 INJECTION, SOLUTION INTRAMUSCULAR; INTRAVENOUS; SUBCUTANEOUS EVERY 5 MIN PRN
Status: DISCONTINUED | OUTPATIENT
Start: 2017-01-19 | End: 2017-01-19

## 2017-01-19 RX ORDER — LORAZEPAM 2 MG/ML
.5-1 INJECTION INTRAMUSCULAR
Status: DISCONTINUED | OUTPATIENT
Start: 2017-01-19 | End: 2017-01-19

## 2017-01-19 RX ORDER — MEPERIDINE HYDROCHLORIDE 25 MG/ML
12.5 INJECTION INTRAMUSCULAR; INTRAVENOUS; SUBCUTANEOUS
Status: DISCONTINUED | OUTPATIENT
Start: 2017-01-19 | End: 2017-01-19 | Stop reason: HOSPADM

## 2017-01-19 RX ORDER — DROPERIDOL 2.5 MG/ML
0.62 INJECTION, SOLUTION INTRAMUSCULAR; INTRAVENOUS
Status: DISCONTINUED | OUTPATIENT
Start: 2017-01-19 | End: 2017-01-19 | Stop reason: HOSPADM

## 2017-01-19 RX ORDER — FENTANYL CITRATE 50 UG/ML
25-50 INJECTION, SOLUTION INTRAMUSCULAR; INTRAVENOUS EVERY 5 MIN PRN
Status: DISCONTINUED | OUTPATIENT
Start: 2017-01-19 | End: 2017-01-19

## 2017-01-19 RX ORDER — PROPOFOL 10 MG/ML
INJECTION, EMULSION INTRAVENOUS PRN
Status: DISCONTINUED | OUTPATIENT
Start: 2017-01-19 | End: 2017-01-19

## 2017-01-19 RX ORDER — ONDANSETRON 2 MG/ML
4 INJECTION INTRAMUSCULAR; INTRAVENOUS EVERY 6 HOURS PRN
Status: DISCONTINUED | OUTPATIENT
Start: 2017-01-19 | End: 2017-01-20 | Stop reason: HOSPADM

## 2017-01-19 RX ORDER — ONDANSETRON 2 MG/ML
4 INJECTION INTRAMUSCULAR; INTRAVENOUS EVERY 30 MIN PRN
Status: DISCONTINUED | OUTPATIENT
Start: 2017-01-19 | End: 2017-01-19 | Stop reason: HOSPADM

## 2017-01-19 RX ORDER — FENTANYL CITRATE 50 UG/ML
25-50 INJECTION, SOLUTION INTRAMUSCULAR; INTRAVENOUS
Status: DISCONTINUED | OUTPATIENT
Start: 2017-01-19 | End: 2017-01-19

## 2017-01-19 RX ORDER — EPHEDRINE SULFATE 50 MG/ML
INJECTION, SOLUTION INTRAMUSCULAR; INTRAVENOUS; SUBCUTANEOUS PRN
Status: DISCONTINUED | OUTPATIENT
Start: 2017-01-19 | End: 2017-01-19

## 2017-01-19 RX ORDER — LIDOCAINE 40 MG/G
CREAM TOPICAL
Status: DISCONTINUED | OUTPATIENT
Start: 2017-01-19 | End: 2017-01-19 | Stop reason: HOSPADM

## 2017-01-19 RX ORDER — HYDROMORPHONE HYDROCHLORIDE 1 MG/ML
.3-.5 INJECTION, SOLUTION INTRAMUSCULAR; INTRAVENOUS; SUBCUTANEOUS EVERY 5 MIN PRN
Status: DISCONTINUED | OUTPATIENT
Start: 2017-01-19 | End: 2017-01-19 | Stop reason: HOSPADM

## 2017-01-19 RX ORDER — ATORVASTATIN CALCIUM 10 MG/1
20 TABLET, FILM COATED ORAL EVERY EVENING
Status: DISCONTINUED | OUTPATIENT
Start: 2017-01-19 | End: 2017-01-20 | Stop reason: HOSPADM

## 2017-01-19 RX ORDER — HYDROMORPHONE HYDROCHLORIDE 1 MG/ML
.3-.5 INJECTION, SOLUTION INTRAMUSCULAR; INTRAVENOUS; SUBCUTANEOUS
Status: DISCONTINUED | OUTPATIENT
Start: 2017-01-19 | End: 2017-01-20 | Stop reason: HOSPADM

## 2017-01-19 RX ORDER — LABETALOL HYDROCHLORIDE 5 MG/ML
10 INJECTION, SOLUTION INTRAVENOUS
Status: DISCONTINUED | OUTPATIENT
Start: 2017-01-19 | End: 2017-01-19 | Stop reason: HOSPADM

## 2017-01-19 RX ORDER — LIDOCAINE HYDROCHLORIDE 20 MG/ML
INJECTION, SOLUTION INFILTRATION; PERINEURAL PRN
Status: DISCONTINUED | OUTPATIENT
Start: 2017-01-19 | End: 2017-01-19

## 2017-01-19 RX ORDER — HYDRALAZINE HYDROCHLORIDE 20 MG/ML
2.5-5 INJECTION INTRAMUSCULAR; INTRAVENOUS EVERY 10 MIN PRN
Status: DISCONTINUED | OUTPATIENT
Start: 2017-01-19 | End: 2017-01-19 | Stop reason: HOSPADM

## 2017-01-19 RX ORDER — ONDANSETRON 2 MG/ML
4 INJECTION INTRAMUSCULAR; INTRAVENOUS EVERY 30 MIN PRN
Status: DISCONTINUED | OUTPATIENT
Start: 2017-01-19 | End: 2017-01-19

## 2017-01-19 RX ORDER — ONDANSETRON 4 MG/1
4 TABLET, ORALLY DISINTEGRATING ORAL EVERY 30 MIN PRN
Status: DISCONTINUED | OUTPATIENT
Start: 2017-01-19 | End: 2017-01-19 | Stop reason: HOSPADM

## 2017-01-19 RX ORDER — FENTANYL CITRATE 50 UG/ML
25-50 INJECTION, SOLUTION INTRAMUSCULAR; INTRAVENOUS
Status: DISCONTINUED | OUTPATIENT
Start: 2017-01-19 | End: 2017-01-19 | Stop reason: HOSPADM

## 2017-01-19 RX ORDER — LORAZEPAM 2 MG/ML
.5-1 INJECTION INTRAMUSCULAR
Status: DISCONTINUED | OUTPATIENT
Start: 2017-01-19 | End: 2017-01-19 | Stop reason: HOSPADM

## 2017-01-19 RX ORDER — ALBUTEROL SULFATE 0.83 MG/ML
2.5 SOLUTION RESPIRATORY (INHALATION) EVERY 4 HOURS PRN
Status: DISCONTINUED | OUTPATIENT
Start: 2017-01-19 | End: 2017-01-19 | Stop reason: HOSPADM

## 2017-01-19 RX ORDER — HYDRALAZINE HYDROCHLORIDE 20 MG/ML
2.5-5 INJECTION INTRAMUSCULAR; INTRAVENOUS EVERY 10 MIN PRN
Status: DISCONTINUED | OUTPATIENT
Start: 2017-01-19 | End: 2017-01-19

## 2017-01-19 RX ORDER — NALOXONE HYDROCHLORIDE 0.4 MG/ML
.1-.4 INJECTION, SOLUTION INTRAMUSCULAR; INTRAVENOUS; SUBCUTANEOUS
Status: DISCONTINUED | OUTPATIENT
Start: 2017-01-19 | End: 2017-01-19 | Stop reason: HOSPADM

## 2017-01-19 RX ORDER — ONDANSETRON 2 MG/ML
INJECTION INTRAMUSCULAR; INTRAVENOUS PRN
Status: DISCONTINUED | OUTPATIENT
Start: 2017-01-19 | End: 2017-01-19

## 2017-01-19 RX ORDER — PROCHLORPERAZINE MALEATE 5 MG
5-10 TABLET ORAL EVERY 6 HOURS PRN
Status: DISCONTINUED | OUTPATIENT
Start: 2017-01-19 | End: 2017-01-20 | Stop reason: HOSPADM

## 2017-01-19 RX ORDER — LABETALOL HYDROCHLORIDE 5 MG/ML
10 INJECTION, SOLUTION INTRAVENOUS
Status: DISCONTINUED | OUTPATIENT
Start: 2017-01-19 | End: 2017-01-19

## 2017-01-19 RX ORDER — DEXAMETHASONE SODIUM PHOSPHATE 4 MG/ML
INJECTION, SOLUTION INTRA-ARTICULAR; INTRALESIONAL; INTRAMUSCULAR; INTRAVENOUS; SOFT TISSUE PRN
Status: DISCONTINUED | OUTPATIENT
Start: 2017-01-19 | End: 2017-01-19

## 2017-01-19 RX ORDER — ONDANSETRON 4 MG/1
4 TABLET, ORALLY DISINTEGRATING ORAL EVERY 6 HOURS PRN
Status: DISCONTINUED | OUTPATIENT
Start: 2017-01-19 | End: 2017-01-20 | Stop reason: HOSPADM

## 2017-01-19 RX ORDER — ACETAMINOPHEN 325 MG/1
650 TABLET ORAL EVERY 6 HOURS
Status: DISCONTINUED | OUTPATIENT
Start: 2017-01-19 | End: 2017-01-20 | Stop reason: HOSPADM

## 2017-01-19 RX ORDER — KETOROLAC TROMETHAMINE 30 MG/ML
30 INJECTION, SOLUTION INTRAMUSCULAR; INTRAVENOUS EVERY 6 HOURS PRN
Status: DISCONTINUED | OUTPATIENT
Start: 2017-01-19 | End: 2017-01-19 | Stop reason: HOSPADM

## 2017-01-19 RX ORDER — SODIUM CHLORIDE, SODIUM LACTATE, POTASSIUM CHLORIDE, CALCIUM CHLORIDE 600; 310; 30; 20 MG/100ML; MG/100ML; MG/100ML; MG/100ML
INJECTION, SOLUTION INTRAVENOUS CONTINUOUS
Status: DISCONTINUED | OUTPATIENT
Start: 2017-01-19 | End: 2017-01-19 | Stop reason: HOSPADM

## 2017-01-19 RX ORDER — NALOXONE HYDROCHLORIDE 0.4 MG/ML
.1-.4 INJECTION, SOLUTION INTRAMUSCULAR; INTRAVENOUS; SUBCUTANEOUS
Status: DISCONTINUED | OUTPATIENT
Start: 2017-01-19 | End: 2017-01-20 | Stop reason: HOSPADM

## 2017-01-19 RX ORDER — PROMETHAZINE HYDROCHLORIDE 25 MG/ML
12.5 INJECTION, SOLUTION INTRAMUSCULAR; INTRAVENOUS
Status: DISCONTINUED | OUTPATIENT
Start: 2017-01-19 | End: 2017-01-19 | Stop reason: HOSPADM

## 2017-01-19 RX ORDER — ONDANSETRON 4 MG/1
4 TABLET, ORALLY DISINTEGRATING ORAL EVERY 30 MIN PRN
Status: DISCONTINUED | OUTPATIENT
Start: 2017-01-19 | End: 2017-01-19

## 2017-01-19 RX ORDER — CEFAZOLIN SODIUM 2 G/100ML
2 INJECTION, SOLUTION INTRAVENOUS
Status: COMPLETED | OUTPATIENT
Start: 2017-01-19 | End: 2017-01-19

## 2017-01-19 RX ADMIN — Medication 10 MG: at 11:50

## 2017-01-19 RX ADMIN — VASOPRESSIN 1 UNITS: 20 INJECTION INTRAVENOUS at 12:59

## 2017-01-19 RX ADMIN — ROCURONIUM BROMIDE 60 MG: 10 INJECTION INTRAVENOUS at 11:35

## 2017-01-19 RX ADMIN — ACETAMINOPHEN 325 MG: 325 TABLET, FILM COATED ORAL at 03:08

## 2017-01-19 RX ADMIN — ROCURONIUM BROMIDE 20 MG: 10 INJECTION INTRAVENOUS at 12:41

## 2017-01-19 RX ADMIN — DEXTROSE AND SODIUM CHLORIDE 1000 ML: 5; 900 INJECTION, SOLUTION INTRAVENOUS at 03:06

## 2017-01-19 RX ADMIN — ACETAMINOPHEN 650 MG: 325 TABLET, FILM COATED ORAL at 23:25

## 2017-01-19 RX ADMIN — METOPROLOL TARTRATE 75 MG: 50 TABLET ORAL at 19:30

## 2017-01-19 RX ADMIN — PHENYLEPHRINE HYDROCHLORIDE 100 MCG: 10 INJECTION, SOLUTION INTRAMUSCULAR; INTRAVENOUS; SUBCUTANEOUS at 12:53

## 2017-01-19 RX ADMIN — CEFAZOLIN SODIUM 2 G: 2 INJECTION, SOLUTION INTRAVENOUS at 11:43

## 2017-01-19 RX ADMIN — BUPIVACAINE 20 ML: 13.3 INJECTION, SUSPENSION, LIPOSOMAL INFILTRATION at 11:15

## 2017-01-19 RX ADMIN — HYDROMORPHONE HYDROCHLORIDE 0.3 MG: 10 INJECTION, SOLUTION INTRAMUSCULAR; INTRAVENOUS; SUBCUTANEOUS at 15:35

## 2017-01-19 RX ADMIN — PROPOFOL 40 MG: 10 INJECTION, EMULSION INTRAVENOUS at 14:38

## 2017-01-19 RX ADMIN — SODIUM CHLORIDE, POTASSIUM CHLORIDE, SODIUM LACTATE AND CALCIUM CHLORIDE: 600; 310; 30; 20 INJECTION, SOLUTION INTRAVENOUS at 11:29

## 2017-01-19 RX ADMIN — FENTANYL CITRATE 25 MCG: 50 INJECTION, SOLUTION INTRAMUSCULAR; INTRAVENOUS at 15:40

## 2017-01-19 RX ADMIN — METOPROLOL TARTRATE 75 MG: 50 TABLET ORAL at 08:27

## 2017-01-19 RX ADMIN — VASOPRESSIN 1 UNITS: 20 INJECTION INTRAVENOUS at 14:03

## 2017-01-19 RX ADMIN — ROCURONIUM BROMIDE 10 MG: 10 INJECTION INTRAVENOUS at 13:35

## 2017-01-19 RX ADMIN — PROPOFOL 150 MG: 10 INJECTION, EMULSION INTRAVENOUS at 11:35

## 2017-01-19 RX ADMIN — Medication 5 MG: at 12:27

## 2017-01-19 RX ADMIN — FENTANYL CITRATE 25 MCG: 50 INJECTION, SOLUTION INTRAMUSCULAR; INTRAVENOUS at 15:32

## 2017-01-19 RX ADMIN — BUPIVACAINE HYDROCHLORIDE AND EPINEPHRINE BITARTRATE 20 ML: 2.5; .005 INJECTION, SOLUTION INFILTRATION; PERINEURAL at 11:15

## 2017-01-19 RX ADMIN — MEROPENEM 1 G: 1 INJECTION, POWDER, FOR SOLUTION INTRAVENOUS at 06:01

## 2017-01-19 RX ADMIN — FENTANYL CITRATE 25 MCG: 50 INJECTION, SOLUTION INTRAMUSCULAR; INTRAVENOUS at 15:22

## 2017-01-19 RX ADMIN — CEFAZOLIN SODIUM 1 G: 2 INJECTION, SOLUTION INTRAVENOUS at 13:36

## 2017-01-19 RX ADMIN — SODIUM CHLORIDE, POTASSIUM CHLORIDE, SODIUM LACTATE AND CALCIUM CHLORIDE: 600; 310; 30; 20 INJECTION, SOLUTION INTRAVENOUS at 17:11

## 2017-01-19 RX ADMIN — GLYCOPYRROLATE 0.1 MG: 0.2 INJECTION, SOLUTION INTRAMUSCULAR; INTRAVENOUS at 12:51

## 2017-01-19 RX ADMIN — SUGAMMADEX 200 MG: 100 INJECTION, SOLUTION INTRAVENOUS at 14:48

## 2017-01-19 RX ADMIN — GLYCOPYRROLATE 0.1 MG: 0.2 INJECTION, SOLUTION INTRAMUSCULAR; INTRAVENOUS at 12:46

## 2017-01-19 RX ADMIN — SODIUM CHLORIDE, POTASSIUM CHLORIDE, SODIUM LACTATE AND CALCIUM CHLORIDE: 600; 310; 30; 20 INJECTION, SOLUTION INTRAVENOUS at 13:39

## 2017-01-19 RX ADMIN — HYDROMORPHONE HYDROCHLORIDE 0.3 MG: 1 INJECTION, SOLUTION INTRAMUSCULAR; INTRAVENOUS; SUBCUTANEOUS at 14:53

## 2017-01-19 RX ADMIN — HYDROMORPHONE HYDROCHLORIDE 0.3 MG: 1 INJECTION, SOLUTION INTRAMUSCULAR; INTRAVENOUS; SUBCUTANEOUS at 13:17

## 2017-01-19 RX ADMIN — ACETAMINOPHEN 650 MG: 325 TABLET, FILM COATED ORAL at 17:11

## 2017-01-19 RX ADMIN — MIDAZOLAM 1 MG: 1 INJECTION INTRAMUSCULAR; INTRAVENOUS at 11:20

## 2017-01-19 RX ADMIN — PROPOFOL 40 MG: 10 INJECTION, EMULSION INTRAVENOUS at 14:51

## 2017-01-19 RX ADMIN — ROCURONIUM BROMIDE 10 MG: 10 INJECTION INTRAVENOUS at 14:08

## 2017-01-19 RX ADMIN — FENTANYL CITRATE 100 MCG: 50 INJECTION, SOLUTION INTRAMUSCULAR; INTRAVENOUS at 11:35

## 2017-01-19 RX ADMIN — DEXAMETHASONE SODIUM PHOSPHATE 4 MG: 4 INJECTION, SOLUTION INTRAMUSCULAR; INTRAVENOUS at 11:35

## 2017-01-19 RX ADMIN — LIDOCAINE HYDROCHLORIDE 100 MG: 20 INJECTION, SOLUTION INFILTRATION; PERINEURAL at 11:35

## 2017-01-19 RX ADMIN — ONDANSETRON 4 MG: 2 INJECTION INTRAMUSCULAR; INTRAVENOUS at 14:30

## 2017-01-19 RX ADMIN — FENTANYL CITRATE 50 MCG: 50 INJECTION, SOLUTION INTRAMUSCULAR; INTRAVENOUS at 11:21

## 2017-01-19 RX ADMIN — ROCURONIUM BROMIDE 10 MG: 10 INJECTION INTRAVENOUS at 13:53

## 2017-01-19 NOTE — PLAN OF CARE
Problem: Discharge Planning  Goal: Discharge Planning (Adult, OB, Behavioral, Peds)  1. Pain controlled on oral meds: Yes  2. Ability to tolerate regular diet: No, NPO  3. Ambulates unassisted: Yes, preoperatively

## 2017-01-19 NOTE — ANESTHESIA POSTPROCEDURE EVALUATION
Patient: Jaya Mari    DAVINCI LAPAROSCOPIC CHOLECYSTECTOMY WITHOUT GRAMS (N/A Abdomen)  Additional InformationProcedure(s):  Davinci Assisted Laparoscopic Cholecystectomy without Grams  - Wound Class: II-Clean Contaminated    Diagnosis:Cholecystitis   Diagnosis Additional Information: No value filed.    Anesthesia Type:  General, ETT    Note:  Anesthesia Post Evaluation    Patient location during evaluation: Bedside  Patient participation: Able to fully participate in evaluation  Level of consciousness: awake  Pain management: adequate  Airway patency: patent  Cardiovascular status: acceptable  Respiratory status: acceptable  Hydration status: acceptable  PONV: none     Anesthetic complications: None          Last vitals:  Filed Vitals:    01/19/17 1515 01/19/17 1530 01/19/17 1545   BP: 121/79 114/79 114/70   Pulse:      Temp:  37.2  C (99  F)    Resp: 18 18    SpO2: 99% 96% 98%       Electronically Signed By: Agatha Claros MD  January 19, 2017  3:49 PM

## 2017-01-19 NOTE — ANESTHESIA PROCEDURE NOTES
Peripheral Nerve Block Procedure Note    Staff:     Anesthesiologist:  MECHE REES    Resident/CRNA:  NATALIE DURANT    Block performed by resident/CRNA in the presence of a teaching physician    Location: Pre-op  Procedure Start/Stop TImes:      1/19/2017 11:15 AM     1/19/2017 11:30 AM    patient identified, IV checked, site marked, risks and benefits discussed, informed consent, monitors and equipment checked, pre-op evaluation, at physician/surgeon's request and post-op pain management      Correct Patient: Yes      Correct Position: Yes      Correct Site: Yes      Correct Procedure: Yes      Correct Laterality:  Yes    Site Marked:  Yes  Procedure details:     Procedure:  TAP    Laterality:  Bilateral    Position:  Supine    Sterile Prep: chloraprep      Needle:  Short bevel and insulated    Needle gauge:  21    Needle length (mm):  110    Ultrasound: Yes      Ultrasound used to identify targeted nerve, plexus, or vascular structure and placed a needle adjacent to it      Permanent Image entered into patiient's record      Abnormal pain on injection: No      Blood Aspirated: No      Paresthesias:  No    Bleeding at site: No      Bolus via:  Needle    Infusion Method:  Single Shot    Complications:  None    Arterial Line Procedure Note  Staff:     Anesthesiologist:  ELEAZAR AN    Resident/CRNA:  JUAN JOSE MOLINA    Arterial line performed by resident/CRNA in presence of a teaching physician    Location: In OR After Induction  Procedure Start/Stop Times:     patient identified, IV checked, site marked, risks and benefits discussed, informed consent, monitors and equipment checked, pre-op evaluation and at physician/surgeon's request      Correct Patient: Yes      Correct Position: Yes      Correct Site: Yes      Correct Procedure: Yes      Correct Laterality:  N/A    Site Marked:  N/A  Line Placement:     Procedure:  Arterial Line    Insertion Site:  Radial    Insertion laterality:  Left    Skin  Prep: Chloraprep      Patient Prep: mask, sterile gloves, hat and hand hygiene      Local skin infiltration:  None    Ultrasound Guided?: Yes      Artery evaluated via ultrasound confirming patency.   Using realtime imaging, the artery was punctured and the needle was observed entering the artery.      A permanent image is NOT entered into the patient's record.      Catheter size:  20 gauge, Quick cath    Dressing:  Tegaderm    Complications:  None obvious    Arterial waveform: Yes      IBP within 10% of NIBP: Yes

## 2017-01-19 NOTE — BRIEF OP NOTE
[facility], [place]    Brief Operative Note    Pre-operative diagnosis: Cholecystitis   Post-operative diagnosis same  Procedure: Procedure(s):  Davinci Assisted Laparoscopic Cholecystectomy without Grams  - Wound Class: II-Clean Contaminated  Surgeon: Surgeon(s) and Role:     * Abner Washington MD - Primary     * Reza Braxton MD - Resident - Assisting     * Charles Julien MD - Resident - Assisting  Anesthesia: General   Estimated blood loss: Less than 50 ml  Drains: None  Specimens:   ID Type Source Tests Collected by Time Destination   A : Gallbladder Tissue Gallbladder and Contents SURGICAL PATHOLOGY EXAM Abner Washington MD 1/19/2017  2:44 PM      Findings:   None.  Complications: None.  Implants: None.        Dictation number [id number]

## 2017-01-19 NOTE — OR NURSING
Bilateral TAP block procedure completed.  Patient tolerated procedure well.  VSS.  No complaints voiced.  Dozes at intervals and is arousable with verbal stimuli.  Patient denies numbness around lips, ringing in ears, nor metallic taste in mouth.  MARIA DE JESUS Brothers RN

## 2017-01-19 NOTE — ANESTHESIA CARE TRANSFER NOTE
Patient: Jaya Mari    DAVINCI LAPAROSCOPIC CHOLECYSTECTOMY WITHOUT GRAMS (N/A Abdomen)  Additional InformationProcedure(s):  Davinci Assisted Laparoscopic Cholecystectomy without Grams  - Wound Class: II-Clean Contaminated    Diagnosis: Cholecystitis   Diagnosis Additional Information: No value filed.    Anesthesia Type:   General, ETT     Note:  Airway :Face Mask  Patient transferred to:PACU  Comments: Airway :Face Mask  Patient transferred to:PACU  Comments: Prior to extubation, patient was breathing spontaneously with appropriate respiratory rate and tidal volume. The patient was following commands, warm and demonstrated adequate strength. Patient was suctioned and extubated without complication.   Transported to PACU on 6L O2 via face mask.   VSS upon arrival to PACU.  Patient denies nausea or pain at this time.   Care transfer plan communicated and patient care transferred to PACU RN     Saad Croft Jr., MD  Anesthesia Resident - [facility]    1/19/2017  3:13 PM        Vitals: (Last set prior to Anesthesia Care Transfer)              Electronically Signed By: Saad Croft MD  January 19, 2017  3:12 PM

## 2017-01-19 NOTE — PLAN OF CARE
Problem: Discharge Planning  Goal: Discharge Planning (Adult, OB, Behavioral, Peds)  1. Pain controlled on oral meds: Yes  2. Ability to tolerate regular diet: No, NPO  3. Ambulates unassisted: Yes, preoperatively  Patient denied pain and nausea the whole shift. Requested for a PRN tylenol for headache. Given IV ABX X2 this shift. IVF running per orders. Up independently to the bathroom.

## 2017-01-19 NOTE — PLAN OF CARE
Problem: Discharge Planning  Goal: Discharge Planning (Adult, OB, Behavioral, Peds)  1. Pain controlled on oral meds: Yes  2. Ability to tolerate regular diet: No, NPO  3. Ambulates unassisted: Yes, preoperatively  Patient denied pain and nausea.

## 2017-01-19 NOTE — PLAN OF CARE
Problem: Discharge Planning  Goal: Discharge Planning (Adult, OB, Behavioral, Peds)  1. Pain controlled on oral meds: No  2. Ability to tolerate regular diet: No, NPO  3. Ambulates unassisted: Yes, preoperatively

## 2017-01-19 NOTE — PLAN OF CARE
Problem: Discharge Planning  Goal: Discharge Planning (Adult, OB, Behavioral, Peds)  1. Pain controlled on oral meds: Yes  2. Ability to tolerate regular diet: No, NPO  3. Ambulates unassisted: Yes, preoperatively  Patient denied pain and nausea.   /69 mmHg  Pulse 109  Temp(Src) 98.1  F (36.7  C) (Oral)  Resp 16  Wt 95.936 kg (211 lb 8 oz)  SpO2 98%

## 2017-01-19 NOTE — PLAN OF CARE
"Problem: Goal Outcome Summary  Goal: Goal Outcome Summary  Outpatient/Observation goals to be met before discharge   Patient came to ER for severe back and abdominal pain. Admitted for acute cholecystitis, surgery tomorrow.Alert and oriented x 4. Oriented to room and call lights, vital signs, weight measured. X-ray done. No complaint of pain since patient got to the unit.Offered but patient declined to take pre-op shower \"I have to sleep, I have not slept for a while, I will tomorrow morning\". Patient gets up indepedently.NPO. IV fluid is infusing via left peripheral iv line.Resting in bed. Call light within reach. Plan: Implement preop order/care. Continue care.  1:Pain controlled on oral meds.No.  2: Ability to tolerate regular diet,. No. NPO.  3:Ambulates unassisted. Yes, preoperatively.        "

## 2017-01-19 NOTE — ANESTHESIA POSTPROCEDURE EVALUATION
Patient: Jaya Mari    DAVINCI LAPAROSCOPIC CHOLECYSTECTOMY WITHOUT GRAMS (N/A Abdomen)  Additional InformationProcedure(s):  Davinci Assisted Laparoscopic Cholecystectomy without Grams  - Wound Class: II-Clean Contaminated    Diagnosis:Cholecystitis   Diagnosis Additional Information: No value filed.    Anesthesia Type:  General, ETT    Note:  Anesthesia Post Evaluation    Patient location during evaluation: bedside and PACU  Patient participation: Able to fully participate in evaluation  Level of consciousness: awake and alert  Pain management: adequate  Airway patency: patent  Cardiovascular status: acceptable, blood pressure returned to baseline and stable  Respiratory status: acceptable and spontaneous ventilation  Hydration status: acceptable  PONV: none     Anesthetic complications: None    Comments: Arthur Smith MD  Staff Anesthesiologist  Phone: [phone]  January 19, 2017          Last vitals:  Filed Vitals:    01/19/17 1600 01/19/17 1615 01/19/17 1620   BP: 109/61 118/72    Pulse:      Temp: 37.2  C (99  F) 36.3  C (97.3  F)    Resp: 16 16    SpO2:  90% 94%       Electronically Signed By: Arthur Smith MD  January 19, 2017

## 2017-01-20 VITALS
BODY MASS INDEX: 28.68 KG/M2 | OXYGEN SATURATION: 94 % | SYSTOLIC BLOOD PRESSURE: 112 MMHG | HEART RATE: 109 BPM | WEIGHT: 211.5 LBS | DIASTOLIC BLOOD PRESSURE: 70 MMHG | TEMPERATURE: 98.2 F | RESPIRATION RATE: 16 BRPM

## 2017-01-20 LAB
ALBUMIN SERPL-MCNC: 3.1 G/DL (ref 3.4–5)
ALP SERPL-CCNC: 62 U/L (ref 40–150)
ALT SERPL W P-5'-P-CCNC: 34 U/L (ref 0–70)
ANION GAP SERPL CALCULATED.3IONS-SCNC: 10 MMOL/L (ref 3–14)
AST SERPL W P-5'-P-CCNC: 44 U/L (ref 0–45)
BILIRUB SERPL-MCNC: 1.2 MG/DL (ref 0.2–1.3)
BUN SERPL-MCNC: 11 MG/DL (ref 7–30)
CALCIUM SERPL-MCNC: 8.4 MG/DL (ref 8.5–10.1)
CHLORIDE SERPL-SCNC: 106 MMOL/L (ref 94–109)
CO2 SERPL-SCNC: 25 MMOL/L (ref 20–32)
COPATH REPORT: NORMAL
CREAT SERPL-MCNC: 1.04 MG/DL (ref 0.66–1.25)
ERYTHROCYTE [DISTWIDTH] IN BLOOD BY AUTOMATED COUNT: 14.2 % (ref 10–15)
GFR SERPL CREATININE-BSD FRML MDRD: 72 ML/MIN/1.7M2
GLUCOSE BLDC GLUCOMTR-MCNC: 149 MG/DL (ref 70–99)
GLUCOSE SERPL-MCNC: 99 MG/DL (ref 70–99)
HCT VFR BLD AUTO: 40.3 % (ref 40–53)
HGB BLD-MCNC: 13.2 G/DL (ref 13.3–17.7)
MCH RBC QN AUTO: 29.9 PG (ref 26.5–33)
MCHC RBC AUTO-ENTMCNC: 32.8 G/DL (ref 31.5–36.5)
MCV RBC AUTO: 91 FL (ref 78–100)
PLATELET # BLD AUTO: 179 10E9/L (ref 150–450)
POTASSIUM SERPL-SCNC: 3.6 MMOL/L (ref 3.4–5.3)
PROT SERPL-MCNC: 6.2 G/DL (ref 6.8–8.8)
RBC # BLD AUTO: 4.41 10E12/L (ref 4.4–5.9)
SODIUM SERPL-SCNC: 141 MMOL/L (ref 133–144)
WBC # BLD AUTO: 12.2 10E9/L (ref 4–11)

## 2017-01-20 PROCEDURE — 85027 COMPLETE CBC AUTOMATED: CPT | Performed by: STUDENT IN AN ORGANIZED HEALTH CARE EDUCATION/TRAINING PROGRAM

## 2017-01-20 PROCEDURE — 82962 GLUCOSE BLOOD TEST: CPT

## 2017-01-20 PROCEDURE — 25800025 ZZH RX 258: Performed by: STUDENT IN AN ORGANIZED HEALTH CARE EDUCATION/TRAINING PROGRAM

## 2017-01-20 PROCEDURE — 36415 COLL VENOUS BLD VENIPUNCTURE: CPT | Performed by: STUDENT IN AN ORGANIZED HEALTH CARE EDUCATION/TRAINING PROGRAM

## 2017-01-20 PROCEDURE — 80053 COMPREHEN METABOLIC PANEL: CPT | Performed by: STUDENT IN AN ORGANIZED HEALTH CARE EDUCATION/TRAINING PROGRAM

## 2017-01-20 PROCEDURE — 25000132 ZZH RX MED GY IP 250 OP 250 PS 637: Performed by: STUDENT IN AN ORGANIZED HEALTH CARE EDUCATION/TRAINING PROGRAM

## 2017-01-20 RX ORDER — OXYCODONE HYDROCHLORIDE 5 MG/1
5-10 TABLET ORAL
Qty: 15 TABLET | Refills: 0 | Status: SHIPPED | OUTPATIENT
Start: 2017-01-20 | End: 2017-01-20

## 2017-01-20 RX ORDER — OXYCODONE HYDROCHLORIDE 5 MG/1
5-10 TABLET ORAL
Qty: 15 TABLET | Refills: 0 | Status: SHIPPED | OUTPATIENT
Start: 2017-01-20 | End: 2017-04-18

## 2017-01-20 RX ORDER — ACETAMINOPHEN 325 MG/1
650 TABLET ORAL EVERY 6 HOURS
Qty: 100 TABLET | Refills: 0 | COMMUNITY
Start: 2017-01-20

## 2017-01-20 RX ADMIN — METOPROLOL TARTRATE 75 MG: 50 TABLET ORAL at 08:11

## 2017-01-20 RX ADMIN — ACETAMINOPHEN 650 MG: 325 TABLET, FILM COATED ORAL at 05:12

## 2017-01-20 RX ADMIN — SODIUM CHLORIDE, POTASSIUM CHLORIDE, SODIUM LACTATE AND CALCIUM CHLORIDE: 600; 310; 30; 20 INJECTION, SOLUTION INTRAVENOUS at 02:56

## 2017-01-20 NOTE — DISCHARGE SUMMARY
General Surgery Discharge Summary    Jaya Mari MRN# [id number]   YOB: 1955 Age: 61 year old     Date of Admission:  1/18/2017  Date of Discharge::  1/20/2017  Admitting Physician:  Abner Washington MD  Discharge Physician:  Abner Washington MD  Primary Care Physician:        Karl Heath          Admission Diagnoses:   Acute cholecystitis [K81.0]          Discharge Diagnosis:   Acute cholecystitis [K81.0]         Procedures:   Da Karen assisted cholecystectomy        Non-operative procedures:   None performed          Consultations:   MEDICATION HISTORY IP PHARMACY CONSULT  VASCULAR ACCESS CARE ADULT IP CONSULT         Medications Prior to Admission:     Prescriptions prior to admission   Medication Sig Dispense Refill Last Dose     METOPROLOL TARTRATE PO Take 75 mg by mouth 2 times daily   1/17/2017     multivitamin, therapeutic with minerals (MULTI-VITAMIN) TABS tablet Take 1 tablet by mouth daily   Past Week     aspirin 325 MG tablet Take by mouth daily   1/17/2017 at 1200     [DISCONTINUED] oxyCODONE (ROXICODONE) 5 MG IR tablet Take 1 tablet (5 mg) by mouth every 6 hours as needed for pain 20 tablet 0 1/18/2017 at 0200     atorvastatin (LIPITOR) 20 MG tablet Take 1 tablet (20 mg) by mouth daily 90 tablet 1 Unknown at Unknown time            Discharge Medications:      Jaya Mari   Home Medication Instructions KURT:[id number]    Printed on:01/20/17 1005   Medication Information                      acetaminophen (TYLENOL) 325 MG tablet  Take 2 tablets (650 mg) by mouth every 6 hours             aspirin 325 MG tablet  Take by mouth daily             atorvastatin (LIPITOR) 20 MG tablet  Take 1 tablet (20 mg) by mouth daily             METOPROLOL TARTRATE PO  Take 75 mg by mouth 2 times daily             multivitamin, therapeutic with minerals (MULTI-VITAMIN) TABS tablet  Take 1 tablet by mouth daily             oxyCODONE (ROXICODONE) 5 MG IR tablet  Take 1-2 tablets (5-10  "mg) by mouth every 3 hours as needed for moderate to severe pain                       Day of Discharge Exam   /70 mmHg  Pulse 109  Temp(Src) 98.2  F (36.8  C) (Oral)  Resp 16  Wt 95.936 kg (211 lb 8 oz)  SpO2 94%    General:  A&Ox3, NAD  Cardio:   RRR  Chest:   Non labored breathing on RA  Abd:   Soft, non-distended, appropriately TTP, incision c/d/i  Ext:   WWP          Brief History of Illness:   From Op Note: \"Jaya Mari is a 61-year-old male who has had issues of right upper quadrant pain intermittently in the past year or 2.  These episodes were usually short-lived, less than 8 hours; however, on previous work up he was recommended to get a cholecystectomy; however, he had issues with insurance and the pain seemed to be not as frequent and so he elected to hold off on that procedure at that time.  He comes in today, however, with 2 more recent episodes of right upper quadrant pain which were more unrelenting and severe.  This was accompanied by a replant repeat ultrasound demonstrating wall thickening and a stone enlarged at the neck of the gallbladder.  Given these findings, we elected to proceed with cholecystectomy at this time.  Risks of surgery were discussed with the patient and he elected to proceed.\"           Hospital Course:   The patient was admitted and underwent the above procedure. The patient tolerated the procedure well. There were no complications. Postoperatively the patient was transferred to the general floor for further care. The patient's diet was slowly advanced as bowel function returned. Pain was controlled with oral pain medication and the patient was able to ambulate and void without difficulty. The patient received appropriate education post operatively. On POD 1 the patient was discharged to home with appropriate instructions and follow up. The patient acknowledged understanding and were in agreement with the plan.         Antibiotics Prescribed at Discharge:   None " prescribed         Imaging Studies:   No studies require specific follow-up  Results for orders placed or performed during the hospital encounter of 01/18/17   CT Abdomen Pelvis w Contrast    Narrative    CT ABDOMEN AND PELVIS WITH CONTRAST January 18, 2017 9:18 AM     HISTORY: Right-sided flank and epigastric pain.    COMPARISON: None.    TECHNIQUE: Following the uneventful administration of 98mL Isovue 370  intravenous contrast, helical sections were acquired from the top of  the diaphragm through the pubic symphysis. Coronal reconstructions  were generated. Radiation dose for this scan was reduced using  automated exposure control, adjustment of the mA and/or kV according  to the patient's size, or iterative reconstruction technique.    FINDINGS:     Abdomen: 1 cm probable cyst in the medial segment of the left lobe of  the liver. The spleen is unremarkable. A few small pancreatic  parenchymal calcifications. The adrenal glands and kidneys are  unremarkable. The gallbladder is present. No enlarged lymph nodes or  free fluid in the upper abdomen. Atherosclerotic calcification in the  abdominal aorta.    Scan through the lower chest is unremarkable.    Pelvis: The small and large bowel are normal in caliber. The  appearance of the appendix is within normal limits. No bowel wall  thickening, pneumatosis or free intraperitoneal gas. No enlarged lymph  nodes or free fluid in the pelvis. Tiny paraumbilical hernia  containing fat.      Impression    IMPRESSION:   1. No cause of acute pain identified in the abdomen or pelvis.  2. A few tiny pancreatic parenchymal calcifications, suggestive of  chronic pancreatitis.    MARYELLEN HAMILTON MD   Abdomen US, limited (RUQ only)    Narrative    ULTRASOUND ABDOMEN LIMITED  1/18/2017 11:34 AM     HISTORY: Right upper quadrant pain.    COMPARISON: CT of the abdomen and pelvis performed 1/18/2017.    FINDINGS: The liver is unremarkable. No evidence for fatty  infiltration. No focal  hepatic lesions. There is a gallstone lodged in  the gallbladder neck. An echogenic lesion along the gallbladder wall  likely represents gallbladder polyp and measures 1.4 cm. The  gallbladder wall is borderline thickened at 0.3 cm. No pericholecystic  fluid is identified. No intra- or extrahepatic bile duct dilatation.  Common hepatic duct is normal in diameter. Limited evaluation of the  right kidney is unremarkable. Pancreas is partially obscured by  overlying bowel gas, but appears normal where seen.       Impression    IMPRESSION:   1. There is a gallstone lodged in the gallbladder neck, and the  gallbladder wall is borderline thickened. Acute cholecystitis could  have this appearance. Please clinically correlate.  2. Probable gallbladder polyp measures 1.4 cm. Surgical consultation  is recommended.    SANDY MONTERROSO MD   XR Chest 2 Views    Narrative    Exam:  XR CHEST 2 VW, 1/18/2017 5:16 PM    History: Preop, smoker    Comparison:  Chest radiograph 1/19/2015    Findings:  PA and lateral view of the chest were obtained. The  cardiomediastinal silhouette is unremarkable. Trachea is midline.  Pulmonary vasculature is distinct. No significant pleural effusion or  appreciable pneumothorax. Linear opacity in the left lung base likely  represents atelectasis. No cristóbal of consolidation.    The visualized upper abdomen is unremarkable. No acute osseous  abnormality.      Impression    Impression: No acute cardiopulmonary abnormality.    I have personally reviewed the examination and initial interpretation  and I agree with the findings.    MILTON ALAMO MD            Final Pathology Result:   Pending at time of discharge         Discharge Instructions:       No lifting    No lifting over 15 lbs and no strenuous physical activity for 3 weeks     No driving or operating machinery    until the day after procedure     No Alcohol   For 24 hours post procedure     Diet Instructions   Resume pre-procedure diet      Shower   No shower for 24 hours post procedure. May shower Postoperative Day (POD)  1.  Keep your incisions clean and dry.  Your incisions are covered with waterproof surgical glue that will wash off on its own in approximately 7-10 days.  You may shower and wash incisions normally - do not aggressively scrub.  No swimming or baths for 3 weeks.  Your stitches are all dissolvable and underneath the skin -- no need to have removed.     Call or return to hospital if you experience fevers >101.5, persistent nausea or vomiting, intractable pain not controlled with oral pain medications, increasing pain, swelling, redness, or drainage from your incisions, or inability to eat or drink, inability to stay hydrated, or symptoms of dehydration including inability to void or lightheadedness.     Discharge Instructions   Patient to follow up with Dr. Washington in General Surgery in approximately 2 weeks.    If you have not been contacted regarding your appointment in one week, please call the surgery clinic to schedule your appointment     Reason for your hospital stay   Acute cholecystitis     Follow Up and recommended labs and tests   Follow-up as needed in clinic in 2-4 weeks with Dr. Felix MD. If your surgeon is not available in this time-frame you might see one of their partners. You should be contacted by a nurse within 3 business days to check how you are doing. If you are not contacted please call RN care coordinator: Aleshia Martin [phone].     Activity   No lifting >10 pounds for 4 weeks.  No rigorous physical activity.    May shower now. No bathing for two weeks.    Call [phone] to schedule or with routine questions during the work week.      Call [phone] and ask to speak with surgery resident if you are having troubles in the evenings, at night, or on weekends. Please call if you experience increasing abdominal pain, nausea, vomiting, increasing drainage from your wounds, chills, or fever >101.5    Take  stool softener while taking narcotic pain medication.    No driving for at least 12 hours after taking narcotic pain medication.     Full Code     Diet   Follow this diet upon discharge: Orders Placed This Encounter  Advance Diet as Tolerated: Regular Diet Adult             Home Health Care:   Not needed           Discharge Disposition:   Discharged to home      Condition at discharge: Stable      Valdez Conde MD  Surgery, PGY-1

## 2017-01-20 NOTE — PROGRESS NOTES
Discharge instruction reviewed.  Patient verbalized understanding. PIV x2 removed, patient waiting for his ride. Patient discharged

## 2017-01-20 NOTE — PROGRESS NOTES
Patient alert and oriented, denies any pain and is on scheduled Tylenol. Tolerated food and fluids, ambulated to the bathroom twice last night, voided adequately without difficulty. Slept intermittently. VSS. Will continue to monitor.

## 2017-01-20 NOTE — PROGRESS NOTES
Doing well post op. Pain controlled. Taking some CLD - no nausea/vomiting. Ambulating. Voiding. Denies passing gas    Temp: 97.3  F (36.3  C) Temp src: Oral BP: 118/70 mmHg   Heart Rate: 81 Resp: 16 SpO2: 95 % O2 Device: Nasal cannula Oxygen Delivery: 2 LPM       A&O, NAD  RRR  Unlabored breathing  Abd s, obese, nd, appropriately tender. Inc c/d/i w/ dermabond      A/P: 61M w/ paroxysmal AFib on 325 aspirin and biliary colic POD 0 s/p robotic lap mulu     - Pain control  - mIVF  - ADAT  - AM labs  - Up ad ronnie  - May restart ASA tmr  - Home tomorrow    Valdez Conde MD  Surgery PGY1

## 2017-01-20 NOTE — OP NOTE
DATE OF SURGERY:  01/19/2017      SURGEON:  Anay Washington MD      ASSISTANT:  Reza Braxton MD and. Charles Julien MD      PREOPERATIVE DIAGNOSIS:  Acute cholecystitis.      POSTOPERATIVE DIAGNOSIS:  Acute cholecystitis.      PROCEDURE PERFORMED:  Da Karen assisted cholecystectomy.      INDICATION FOR PROCEDURE:  Jaya Mari is a 61-year-old male who has had issues of right upper quadrant pain intermittently in the past year or 2.  These episodes were usually short-lived, less than 8 hours; however, on previous work up he was recommended to get a cholecystectomy; however, he had issues with insurance and the pain seemed to be not as frequent and so he elected to hold off on that procedure at that time.  He comes in today, however, with 2 more recent episodes of right upper quadrant pain which were more unrelenting and severe.  This was accompanied by a replant repeat ultrasound demonstrating wall thickening and a stone enlarged at the neck of the gallbladder.  Given these findings, we elected to proceed with cholecystectomy at this time.  Risks of surgery were discussed with the patient and he elected to proceed.      DESCRIPTION OF PROCEDURE:  The patient was brought to the operating room.  He was placed in supine position on the operating room table.  General anesthesia was administered by anesthesia team.  A Cisse catheter was placed under sterile conditions.  The patient was positioned with his arms out on arm boards with care taken to pad all pressure points.  His abdomen was shaved with clippers and then prepped and draped in the standard sterile fashion.  We initiated the procedure by coming through his umbilicus.  The patient had a very small subcentimeter hernia at this site and we used this fascial opening in order to gain entry into the abdomen.  Under direct visualization, a trocar was inserted.  This is a 12 mm Chacha port.  The abdomen was then insufflated without any difficulty.  We placed 2  additional 8 mm robotic  ports on the patient's left side and one on his right side in a straight line across at the level of the umbilicus.  We then docked the robot positioning the instruments over a target anatomy in the right upper quadrant.  Prior to docking the robot, the patient was positioned in reverse a number of his right side.  I then used the probe grasp the [place] dissector and hook in order to dissect the gallbladder.  On first glance there was quite a bit of adhesions particularly of omentum to the gallbladder.  These were taken down.  The gallbladder was serially retracted up into the right upper quadrant.  I began my dissection around the cystic triangle.  Ultimately I was able to identify the cystic duct and the cystic artery.  The cystic duct did appear to be quite large; however, there was a stone lodged near this and following dissection, we were able to achieve a critical view of safety in which we were able to see the cystic duct, the cystic artery and a liver behind this.  I  had completed some dissection of the peritoneum using hook electrocautery in order completely free this up and secured our anatomy.  Once this was all clear, I placed 2 clips on the proximal side of the cystic duct.  I placed 2 clips on the proximal side of the cystic artery.  These were then divided.  The gallbladder then was taken off of the liver with electrocautery.  Up at the fundus of the gallbladder, there was intrahepatic portion and of note, the liver was quite fragile and bled easily with any type retraction.  These were treated with electrocautery and once satisfied that this area was hemostatic.  All fluid was suctioned out.  We had placed a 5 mm epigastric port in order to use it as an assistant port in the patient's epigastrium mostly for the suction .  The gallbladder was quite inflamed and adherent to adjacent structures requiring actual dissection.  However, we were ultimately able to  complete the dissection safely.  The gallbladder was placed in an EndoCatch bag and retrieved via the 12 mm port.  There was a large stone lodged within the gallbladder as anticipated.  The remainder of the ports were removed under direct visualization.  The abdomen was desufflated.  The umbilical port site was closed with 0 Vicryl on UR-6 needle.  The remainder of the ports were closed with running 4-0 Monocryl.  There were no immediate complications.  I was present for the entire procedure.      ESTIMATED BLOOD LOSS:  20 mL.      SPECIMEN:  Gallbladder.      WOUND.  Class II case.         ROSALIA MCNULTY MD             D: 2017 18:42   T: 2017 21:25   MT:       Name:     MAGDALENA ALSTON   MRN:      [id number]        Account:        [id number]   :      1955           Procedure Date: 2017      Document: [id number]

## 2017-01-20 NOTE — PROGRESS NOTES
Pt in with acute cholecystitis and lap chloey performed today.5 lap sites on abdomen appear clean, dry and intact. No drainage noted. Denies pain, but remains on scheduled Tylenol.  Has been alert and oriented x 4. Ambulated to bathroom and voided with out difficulty. Tolerated regular diet. Has been asleep intermittently. Call light within reach and encouraged to call for assistance. Pt verbalized understanding. VSS.  Blood pressure 119/75, pulse 109, temperature 97.3  F (36.3  C), temperature source Oral, resp. rate 18, weight 95.936 kg (211 lb 8 oz), SpO2 95 %. Will continue to monitor.

## 2017-01-23 ENCOUNTER — CARE COORDINATION (OUTPATIENT)
Dept: SURGERY | Facility: CLINIC | Age: 62
End: 2017-01-23

## 2017-01-23 NOTE — PROGRESS NOTES
Magdalena Alston is a patient of Dr. Rosalia Mcnulty that underwent a Da Karen assisted cholecystectomy approximately 4 days ago. He is in contact with the clinic at this time for a status update. The patient reports that he is up ad ronnie, tolerating  po, afebrile, pain well managed with tylenol, Dermabond applied and his wounds are healing without signs or symptoms or infection. Magdalena Alston denies problems with their bowels and is urinating normally and is slowly returning to their normal routine.    All of his questions were answered including reviewing restrictions, pathology, and wound care.  He will call this office if he has any further questions and/or concerns.      Pathology reviewed with patient:  Yes: Reviewed     Magdalena Alston is scheduled for follow up with Dr. Mcnulty 2/15 at 12 PM.     Patient Name: MAGDALENA ALSTON   MR#: [id number]   Specimen #:    Collected: 1/19/2017   Received: 1/19/2017   Reported: 1/20/2017 18:48   Ordering Phy(s): ROSALIA MCNULTY     For improved result formatting, select 'View Enhanced Report Format'   under Linked Documents section.     SPECIMEN(S):   Gallbladder     FINAL DIAGNOSIS:   GALLBLADDER, CHOLECYSTECTOMY:   - Chronic cholecystitis with cholelithiasis

## 2017-02-14 ENCOUNTER — TELEPHONE (OUTPATIENT)
Dept: SURGERY | Facility: CLINIC | Age: 62
End: 2017-02-14

## 2017-02-14 NOTE — TELEPHONE ENCOUNTER
Established Patient Telephone Reminder Call    Date of call:  02/14/17  Phone numbers:  Home number on file [phone] (home)    Reached patient/confirmed appointment:  Yes  Appointment with:   Dr. Abner Washington  Reason for visit:  Follow up

## 2017-02-15 ENCOUNTER — OFFICE VISIT (OUTPATIENT)
Dept: SURGERY | Facility: CLINIC | Age: 62
End: 2017-02-15

## 2017-02-15 VITALS
OXYGEN SATURATION: 97 % | HEIGHT: 72 IN | HEART RATE: 77 BPM | TEMPERATURE: 98.8 F | BODY MASS INDEX: 28.59 KG/M2 | SYSTOLIC BLOOD PRESSURE: 144 MMHG | DIASTOLIC BLOOD PRESSURE: 87 MMHG | WEIGHT: 211.1 LBS

## 2017-02-15 DIAGNOSIS — K81.1 CHRONIC CHOLECYSTITIS: Primary | ICD-10-CM

## 2017-02-15 ASSESSMENT — PAIN SCALES - GENERAL: PAINLEVEL: NO PAIN (0)

## 2017-02-15 NOTE — NURSING NOTE
Chief Complaint   Patient presents with     Surgical Followup     3 weeks f/u       Vitals:    02/15/17 1139   BP: 144/87   BP Location: Left arm   Patient Position: Chair   Pulse: 77   Temp: 98.8  F (37.1  C)   SpO2: 97%   Weight: 211 lb 1.6 oz   Height: 6'       Body mass index is 28.63 kg/(m^2).    Dash Ramachandran MA

## 2017-02-15 NOTE — LETTER
2/15/2017   RE: Jaya Mari  [address] [place] [address]   Dear Colleague,    Thank you for referring your patient, Jaya Mari, to the [facility] GENERAL SURGERY at [facility]. Please see a copy of my visit note below.    Surgery Clinic Post Op Visit    Procedure:Da Karen assisted lap mulu  Date: 1/19/17    Fevers: none  Appetite: good  Nausea/vomiting: none  Bowel movements: normal  Use of pain medications: no narcotics  Ambulating: without difficulty    Issues/Complaints: Feeling well, soreness has resolved    PE:  /87 (BP Location: Left arm, Patient Position: Chair)  Pulse 77  Temp 98.8  F (37.1  C)  Ht 1.829 m (6')  Wt 95.8 kg (211 lb 1.6 oz)  SpO2 97%  BMI 28.63 kg/m2  Gen: alert, no distress  Incision: all port sites intact  Abd:soft, nontender, nondistended    Path: chronic cholecystitis    Plan:  All questions answered, path reviewed  RTC as needed    Abner Washington MD  [phone]

## 2017-02-22 ENCOUNTER — TRANSFERRED RECORDS (OUTPATIENT)
Dept: HEALTH INFORMATION MANAGEMENT | Facility: CLINIC | Age: 62
End: 2017-02-22

## 2017-02-22 LAB — EJECTION FRACTION: 59

## 2017-03-02 NOTE — PROGRESS NOTES
Surgery Clinic Post Op Visit    Procedure:Da Karen assisted lap mulu  Date: 1/19/17    Fevers: none  Appetite: good  Nausea/vomiting: none  Bowel movements: normal  Use of pain medications: no narcotics  Ambulating: without difficulty    Issues/Complaints: Feeling well, soreness has resolved    PE:  /87 (BP Location: Left arm, Patient Position: Chair)  Pulse 77  Temp 98.8  F (37.1  C)  Ht 1.829 m (6')  Wt 95.8 kg (211 lb 1.6 oz)  SpO2 97%  BMI 28.63 kg/m2  Gen: alert, no distress  Incision: all port sites intact  Abd:soft, nontender, nondistended    Path: chronic cholecystitis      Plan:  All questions answered, path reviewed  RTC as needed    Abner Washington MD  [phone]

## 2017-03-30 ENCOUNTER — TELEPHONE (OUTPATIENT)
Dept: CARDIOLOGY | Facility: CLINIC | Age: 62
End: 2017-03-30

## 2017-03-30 NOTE — TELEPHONE ENCOUNTER
Called pt to schedule a 6 month follow up. Pt scheduled appointment and had no questions or concerns.  Lucía Charles, CMA

## 2017-04-18 ENCOUNTER — OFFICE VISIT (OUTPATIENT)
Dept: CARDIOLOGY | Facility: CLINIC | Age: 62
End: 2017-04-18
Payer: COMMERCIAL

## 2017-04-18 VITALS
OXYGEN SATURATION: 95 % | HEART RATE: 88 BPM | BODY MASS INDEX: 29.84 KG/M2 | DIASTOLIC BLOOD PRESSURE: 80 MMHG | WEIGHT: 220 LBS | SYSTOLIC BLOOD PRESSURE: 125 MMHG

## 2017-04-18 DIAGNOSIS — I48.0 PAROXYSMAL ATRIAL FIBRILLATION (H): Primary | ICD-10-CM

## 2017-04-18 PROCEDURE — 99214 OFFICE O/P EST MOD 30 MIN: CPT | Performed by: INTERNAL MEDICINE

## 2017-04-18 NOTE — NURSING NOTE
Jaya Mari's goals for this visit include: Med check and Stress test results  He requests these members of his care team be copied on today's visit information: PCP    PCP: Karl Heath    Referring Provider:  No referring provider defined for this encounter.    Chief Complaint   Patient presents with     RECHECK     meds and stress test results       Initial /80 (BP Location: Left arm, Patient Position: Chair, Cuff Size: Adult Large)  Pulse 88  Wt 99.8 kg (220 lb)  SpO2 95%  BMI 29.84 kg/m2 Estimated body mass index is 29.84 kg/(m^2) as calculated from the following:    Height as of 2/15/17: 1.829 m (6').    Weight as of this encounter: 99.8 kg (220 lb).  Medication Reconciliation: complete

## 2017-04-18 NOTE — MR AVS SNAPSHOT
After Visit Summary   4/18/2017    Jaya Mari    MRN: [id number]           Patient Information     Date Of Birth          1955        Visit Information        Provider Department      4/18/2017 2:30 PM Ritchie Price MD [facility]        Today's Diagnoses     Paroxysmal atrial fibrillation (H)    -  1       Follow-ups after your visit        Who to contact     If you have questions or need follow up information about today's clinic visit or your schedule please contact [facility] directly at [phone].  Normal or non-critical lab and imaging results will be communicated to you by [employer], letter or phone within 4 business days after the clinic has received the results. If you do not hear from us within 7 days, please contact the clinic through [employer] or phone. If you have a critical or abnormal lab result, we will notify you by phone as soon as possible.  Submit refill requests through [employer] or call your pharmacy and they will forward the refill request to us. Please allow 3 business days for your refill to be completed.          Additional Information About Your Visit        MyChart Information     [employer] gives you secure access to your electronic health record. If you see a primary care provider, you can also send messages to your care team and make appointments. If you have questions, please call your primary care clinic.  If you do not have a primary care provider, please call [phone] and they will assist you.      [employer] is an electronic gateway that provides easy, online access to your medical records. With [employer], you can request a clinic appointment, read your test results, renew a prescription or communicate with your care team.     To access your existing account, please contact your [facility] or call [phone] for assistance.        Care EveryWhere ID     This is your Care EveryWhere ID. This  could be used by other organizations to access your [facility] medical records  [id number]        Your Vitals Were     Pulse Pulse Oximetry BMI (Body Mass Index)             88 95% 29.84 kg/m2          Blood Pressure from Last 3 Encounters:   04/18/17 125/80   02/15/17 144/87   01/20/17 112/70    Weight from Last 3 Encounters:   04/18/17 99.8 kg (220 lb)   02/15/17 95.8 kg (211 lb 1.6 oz)   01/18/17 95.9 kg (211 lb 8 oz)              Today, you had the following     No orders found for display       Primary Care Provider Office Phone # Fax #    Karl Heath PA-C [phone]       [address] [address]        Thank you!     Thank you for choosing [facility]  for your care. Our goal is always to provide you with excellent care. Hearing back from our patients is one way we can continue to improve our services. Please take a few minutes to complete the written survey that you may receive in the mail after your visit with us. Thank you!             Your Updated Medication List - Protect others around you: Learn how to safely use, store and throw away your medicines at www.disposemymeds.org.          This list is accurate as of: 4/18/17  3:02 PM.  Always use your most recent med list.                   Brand Name Dispense Instructions for use    acetaminophen 325 MG tablet    TYLENOL    100 tablet    Take 2 tablets (650 mg) by mouth every 6 hours       aspirin 325 MG tablet      Take by mouth daily       atorvastatin 20 MG tablet    LIPITOR    90 tablet    Take 1 tablet (20 mg) by mouth daily       METOPROLOL TARTRATE PO      Take 75 mg by mouth 2 times daily       Multi-vitamin Tabs tablet      Take 1 tablet by mouth daily

## 2017-04-19 NOTE — PROGRESS NOTES
"2017         Karl Heath PA-C   [id number] [place] [address]      RE:  Jaya Mari, MR# [id number],  1955      Dear Mr. Heath:      It was a pleasure participating in the care of your patient, Mr. Jaya Mari.  As you know, he is a 61-year-old gentleman who I see today for paroxysmal atrial fibrillation.      His past medical history is significant for the followin.  Hypertension.   2.  Hyperlipidemia.   3.  Low back pain.   4.  Anxiety.   5.  Genital herpes.      His cardiac history is significant for paroxysmal atrial fibrillation.  He felt his chest was \"vibrating,\" and felt it beating fast and irregular.  He went to [facility] and was found to be atrial fibrillation with rapid ventricular response on 2016.  He was given IV diltiazem and metoprolol, and he spontaneously converted to normal sinus rhythm.      His CHADS-VASc2 score is 1 for hypertension and borderline diabetes.      I last saw him 2016.  At that time he was doing adequately.  He presents today for continuing care.      Since our last visit, he has continued to do well.  He has not had any further episodes of atrial whatsoever since increasing his metoprolol dose to 75 mg twice daily.  He denies other chest pain, problems breathing, PND, orthopnea, edema, palpitations, syncope or near-syncope.      PRESENT MEDICATIONS:     1.  Metoprolol tartrate 75 mg twice daily.   2.  Atorvastatin 20 mg a day.   3.  Aspirin 325 mg a day.      PHYSICAL EXAM:     VITAL SIGNS:  His blood pressure is 125/80 with a pulse of 88.  His weight is 220 pounds.   NECK:  No obvious jugular venous distention.   LUNGS:  Clear to auscultation.  Respiratory effort is normal.   CARDIAC:  Regular rate and rhythm.  No obvious murmur or gallop appreciated.   BELLY:  Soft, nontender.   EXTREMITIES:  Without gross edema.      LABS, 2017:  Potassium 3.6, GFR normal.      Echocardiogram, " 08/03/2016:  Ejection fraction was 60-65%.  No significant valvular pathology identified.  Atrial dimension is normal.      08/30/2016:  Total cholesterol 213, triglycerides 180, HDL 36, .      Pharmacologic nuclear stress test, 02/22/2016:  Exercised for 8 minutes 29 seconds on a Benito protocol.  Peak heart rate 148 beats minute, peak systolic blood pressure 162, rate pressure product 23,976.  Nuclear images revealed no evidence for stress-induced ischemia.        IMPRESSION:      Jaya is a 61-year-old gentleman whose cardiac history is significant for paroxysmal atrial fibrillation.  CHADS-VASc2 score is 1 for hypertension; however, he also has borderline diabetes.  He spontaneously converted to normal sinus rhythm after IV diltiazem and metoprolol were given in the [facility].      He has had no further episodes of atrial fibrillation since increasing his metoprolol dose from 50 to 75 mg a day.  His echocardiogram, 08/03/2016, reveals a structurally normal heart with normal atrial dimensions.   He appears stable from a clinical standpoint.        PLAN:     1.  Decrease his aspirin dose from 325 to 81 mg a day.     2.  Start taking his Lipitor 20 mg a day.     3.  He will attempt to discontinue smoking.     4.  Continue metoprolol 25 mg twice daily.     5.  Follow up in one year or earlier if needed.     6.  If he should have another episode of atrial fibrillation, full anticoagulation could be contemplated at that time if there are no significant bleeding contraindications.  Considering he has hypertension and borderline diabetes, his annual risk for thromboembolic event would be in the 1-2% range.      Once again, it was a pleasure participating in the care of your patient, Mr. Jaya Mari.  Please feel free to contact me anytime if you have any questions regarding his care in the future.         Sincerely,      ERIKA ZHONG MD             D: 04/18/2017 15:01   T: 04/19/2017 05:16    MT: [id number]      Name:     MAGDALENA ALSTON   MRN:      [id number]        Account:      [id number]   :      1955      Document: [id number]       cc: Karl CASTRO

## 2017-05-02 DIAGNOSIS — I48.91 ATRIAL FIBRILLATION, UNSPECIFIED TYPE (H): Primary | ICD-10-CM

## 2017-05-02 NOTE — TELEPHONE ENCOUNTER
Jaya would like to go back to his 75mg dose as two different prescriptions, the 25mg and 50mg tablets he previously was taking.      Because this has been marked as historical by cardiology we are unable to correct this through protocol in pharmacy.    Thank you!  Anay Ordonez, PharmD [facility]  Phone [phone]  Fax [phone]

## 2017-05-03 RX ORDER — METOPROLOL TARTRATE 25 MG/1
25 TABLET, FILM COATED ORAL 2 TIMES DAILY
Qty: 180 TABLET | Refills: 1 | Status: SHIPPED | OUTPATIENT
Start: 2017-05-03

## 2017-05-03 RX ORDER — METOPROLOL TARTRATE 50 MG
50 TABLET ORAL 2 TIMES DAILY
Qty: 180 TABLET | Refills: 1 | Status: SHIPPED | OUTPATIENT
Start: 2017-05-03

## 2017-05-11 ENCOUNTER — MYC MEDICAL ADVICE (OUTPATIENT)
Dept: FAMILY MEDICINE | Facility: CLINIC | Age: 62
End: 2017-05-11

## 2017-05-12 NOTE — TELEPHONE ENCOUNTER
Team, is this something that goes through medical records? Or do we just send his problem list and med list and recent labs from here?   Let patient know - I don't think this needs me to respond otherwise than what I did.  Thanks.  Jim

## 2017-05-12 NOTE — TELEPHONE ENCOUNTER
Are you okay with below? Would you like to write a letter, or send an H&P, labs, and med list.    Marco Antonio Beckwith RN

## 2017-05-16 DIAGNOSIS — E78.5 HYPERLIPIDEMIA LDL GOAL <100: ICD-10-CM

## 2017-05-16 DIAGNOSIS — I48.91 ATRIAL FIBRILLATION, UNSPECIFIED TYPE (H): Primary | ICD-10-CM

## 2018-01-18 ENCOUNTER — TELEPHONE (OUTPATIENT)
Dept: CARDIOLOGY | Facility: CLINIC | Age: 63
End: 2018-01-18

## 2018-01-18 NOTE — TELEPHONE ENCOUNTER
I spoke with the patient to schedule a one year follow up with fasting labs and an echo prior with Dr. Price. Patient stated he is no longer covered by insurance and is [facility]. I did ask the patient to contact us if there is anything we can do to be of assistance.    Makayla JAY [facility]~Specialty/Med Surg   [phone]

## 2020-03-02 ENCOUNTER — HEALTH MAINTENANCE LETTER (OUTPATIENT)
Age: 65
End: 2020-03-02

## 2020-12-20 ENCOUNTER — HEALTH MAINTENANCE LETTER (OUTPATIENT)
Age: 65
End: 2020-12-20

## 2021-10-03 ENCOUNTER — HEALTH MAINTENANCE LETTER (OUTPATIENT)
Age: 66
End: 2021-10-03

## 2022-01-23 ENCOUNTER — HEALTH MAINTENANCE LETTER (OUTPATIENT)
Age: 67
End: 2022-01-23

## 2022-09-04 ENCOUNTER — HEALTH MAINTENANCE LETTER (OUTPATIENT)
Age: 67
End: 2022-09-04

## 2023-04-30 ENCOUNTER — HEALTH MAINTENANCE LETTER (OUTPATIENT)
Age: 68
End: 2023-04-30

## 2023-08-07 NOTE — ED PROVIDER NOTES
History     Chief Complaint   Patient presents with     Flank Pain     Right-sided flank pain since last evening. Denies nausea.      Abdominal Pain     Epigastric pain. Pt states his stomach feels distended since last evening.     HPI  Jaya Mari is a 61 year old male with a history of biliary colic and atrial fibrillation who presents to the emergency department complaining of twelve-hour history of right-sided flank pain and epigastric pain.  Patient describes pain as constant, burning sensation originating in right lower flank that sometimes radiates through the chest to the upper epigastric area.  Patient describes two year history of these symptoms that will present intermittently for anywhere from 2-6 hours before resolving.  Today's episode is has lasted longer and is more severe than anything he has experienced previously.  He has been diagnosed with biliary colic in the past with ultrasound in 2015 showing gallstones and sludge.  He was told in the past he should have cholecystectomy performed but never did because of insurance issues.    I have reviewed the Medications, Allergies, Past Medical and Surgical History, and Social History in the Epic system.    Review of Systems   Constitutional: Negative for fever.   Respiratory: Negative for shortness of breath.    Cardiovascular: Negative for chest pain.   Gastrointestinal: Negative for abdominal pain.   All other systems reviewed and are negative.    PAST MEDICAL HISTORY:     Atrial fibrillation, hypertension    PAST SURGICAL HISTORY:   Past Surgical History   Procedure Laterality Date     Head & neck surgery       [place] teeth removed.     Esophagoscopy, gastroscopy, duodenoscopy (egd), combined N/A 6/22/2015     Procedure: COMBINED ESOPHAGOSCOPY, GASTROSCOPY, DUODENOSCOPY (EGD), BIOPSY SINGLE OR MULTIPLE;  Surgeon: Dipika Rangel MD;  Location:  OR       FAMILY HISTORY:   Family History   Problem Relation Age of Onset     C.A.D. Father         Problem: Adult Inpatient Plan of Care  Goal: Plan of Care Review  Outcome: Ongoing, Progressing  Goal: Patient-Specific Goal (Individualized)  Outcome: Ongoing, Progressing  Goal: Absence of Hospital-Acquired Illness or Injury  Outcome: Ongoing, Progressing  Goal: Optimal Comfort and Wellbeing  Outcome: Ongoing, Progressing  Goal: Readiness for Transition of Care  Outcome: Ongoing, Progressing     Problem: Skin Injury Risk Increased  Goal: Skin Health and Integrity  Outcome: Ongoing, Progressing     Problem: Fall Injury Risk  Goal: Absence of Fall and Fall-Related Injury  Outcome: Ongoing, Progressing     Problem: Adult Inpatient Plan of Care  Goal: Plan of Care Review  Outcome: Ongoing, Progressing  Goal: Patient-Specific Goal (Individualized)  Outcome: Ongoing, Progressing  Goal: Absence of Hospital-Acquired Illness or Injury  Outcome: Ongoing, Progressing  Goal: Optimal Comfort and Wellbeing  Outcome: Ongoing, Progressing  Goal: Readiness for Transition of Care  Outcome: Ongoing, Progressing     Problem: Skin Injury Risk Increased  Goal: Skin Health and Integrity  Outcome: Ongoing, Progressing     Problem: Fall Injury Risk  Goal: Absence of Fall and Fall-Related Injury  Outcome: Ongoing, Progressing     Problem: Adult Inpatient Plan of Care  Goal: Plan of Care Review  Outcome: Ongoing, Progressing  Goal: Patient-Specific Goal (Individualized)  Outcome: Ongoing, Progressing  Goal: Absence of Hospital-Acquired Illness or Injury  Outcome: Ongoing, Progressing  Goal: Optimal Comfort and Wellbeing  Outcome: Ongoing, Progressing  Goal: Readiness for Transition of Care  Outcome: Ongoing, Progressing     Problem: Skin Injury Risk Increased  Goal: Skin Health and Integrity  Outcome: Ongoing, Progressing     Problem: Fall Injury Risk  Goal: Absence of Fall and Fall-Related Injury  Outcome: Ongoing, Progressing     Problem: Adult Inpatient Plan of Care  Goal: Plan of Care Review  Outcome: Ongoing, Progressing  Goal:  Heart attack at 72      Cardiovascular Father        SOCIAL HISTORY:   Social History   Substance Use Topics     Smoking status: Current Every Day Smoker -- 1.00 packs/day     Smokeless tobacco: Never Used      Comment: 1 pack a day, 20+ years     Alcohol Use: No       Current Facility-Administered Medications   Medication     0.9% sodium chloride infusion     HYDROmorphone (PF) (DILAUDID) injection 0.5 mg     sodium chloride 0.9 % for CT scan flush dose 50 mL     HYDROmorphone (PF) (DILAUDID) injection 0.5 mg     Current Outpatient Prescriptions   Medication     METOPROLOL TARTRATE PO     multivitamin, therapeutic with minerals (MULTI-VITAMIN) TABS tablet     oxyCODONE (ROXICODONE) 5 MG IR tablet     aspirin 325 MG tablet     atorvastatin (LIPITOR) 20 MG tablet             Physical Exam   BP: (!) 141/98 mmHg  Pulse: 109  Temp: 98.4  F (36.9  C)  Resp: 16  Weight: 97.07 kg (214 lb)  SpO2: 95 %  Physical Exam   Constitutional: No distress.   HENT:   Head: Atraumatic.   Mouth/Throat: Oropharynx is clear and moist. No oropharyngeal exudate.   Eyes: Pupils are equal, round, and reactive to light. No scleral icterus.   Cardiovascular: Normal heart sounds and intact distal pulses.    Pulmonary/Chest: Breath sounds normal. No respiratory distress.   Abdominal: Soft. Bowel sounds are normal. There is no tenderness.   Musculoskeletal: He exhibits no edema or tenderness.   Skin: Skin is warm. No rash noted. He is not diaphoretic.       ED Course     [unfilled]  Procedures             EKG Interpretation:      Interpreted by Melecio Arevalo  Time reviewed: 0821  Symptoms at time of EKG: abd pain   Rhythm: normal sinus   Rate: normal  Axis: normal  Ectopy: none  Conduction: normal  ST Segments/ T Waves: No ST-T wave changes  Q Waves: none  Comparison to prior: Unchanged from 1/2/2017    Clinical Impression: normal EKG          Critical Care time:  none               Labs Ordered and Resulted from Time of ED Arrival Up to the Time  Patient-Specific Goal (Individualized)  Outcome: Ongoing, Progressing  Goal: Absence of Hospital-Acquired Illness or Injury  Outcome: Ongoing, Progressing  Goal: Optimal Comfort and Wellbeing  Outcome: Ongoing, Progressing  Goal: Readiness for Transition of Care  Outcome: Ongoing, Progressing     Problem: Skin Injury Risk Increased  Goal: Skin Health and Integrity  Outcome: Ongoing, Progressing     Problem: Fall Injury Risk  Goal: Absence of Fall and Fall-Related Injury  Outcome: Ongoing, Progressing      of Departure from the ED   CBC WITH PLATELETS DIFFERENTIAL - Abnormal; Notable for the following:     WBC 12.2 (*)     Absolute Neutrophil 10.4 (*)     All other components within normal limits   COMPREHENSIVE METABOLIC PANEL - Abnormal; Notable for the following:     Glucose 142 (*)     All other components within normal limits   LACTIC ACID WHOLE BLOOD - Abnormal; Notable for the following:     Lactic Acid 2.4 (*)     All other components within normal limits   UA MACROSCOPIC WITH REFLEX TO MICRO AND CULTURE - Abnormal; Notable for the following:     Mucous Urine Present (*)     All other components within normal limits   INR   PARTIAL THROMBOPLASTIN TIME   LIPASE   TROPONIN I   LACTIC ACID WHOLE BLOOD   TROPONIN POCT       Assessments & Plan (with Medical Decision Making)   Patient with a prior history of cholelithiasis and atrial fibrillation, presenting for right-sided abdominal and flank pain.  DDx considered but not restricted to: AAA, Cholecystitis/ cholelithiasis, Diverticulitis with/ without perforation, Pancreatitis, Atypical cardiac presentation, SBO, Ischemia, Perforated viscous, Ulcer disease, Duodenitis, Renal colic, Volvulus, Gastritis, Hiatal hernia, Appendicitis, and other emergent and life threatening causes of abdominal pain.  On exam, he did not have peritoneal signs but appeared tachycardic and uncomfortable.  He was complaining of significant back pain.  His white blood cell count and lactate were initially elevated.  A CT with IV contrast was performed which did not show any evidence of aortic aneurysm or dissection or other vascular catastrophe.  Subsequent workup revealed a stone lodged in the gallbladder neck and some thickening of the gallbladder wall consistent with likely acute cholecystitis.    Pain was managed with IV Dilaudid, and surgery was consulted.  At this point we will start IV antibiotics and admit to the general surgery service at [facility].  I've spoken with the staff and  the resident there and the patient is in agreement and is stable for transportation to the [facility].    I have reviewed the nursing notes.    I have reviewed the findings, diagnosis, plan and need for follow up with the patient.    New Prescriptions    No medications on file       Final diagnoses:   Acute cholecystitis       1/18/2017   [facility], [place], EMERGENCY DEPARTMENT      Melecio Arevalo MD  01/18/17 1500
